# Patient Record
Sex: MALE | Race: WHITE | NOT HISPANIC OR LATINO | Employment: UNEMPLOYED | URBAN - METROPOLITAN AREA
[De-identification: names, ages, dates, MRNs, and addresses within clinical notes are randomized per-mention and may not be internally consistent; named-entity substitution may affect disease eponyms.]

---

## 2023-11-29 ENCOUNTER — HOSPITAL ENCOUNTER (EMERGENCY)
Facility: HOSPITAL | Age: 53
Discharge: HOME | End: 2023-11-29
Attending: EMERGENCY MEDICINE

## 2023-11-29 VITALS
SYSTOLIC BLOOD PRESSURE: 175 MMHG | HEART RATE: 83 BPM | RESPIRATION RATE: 18 BRPM | TEMPERATURE: 98 F | WEIGHT: 200 LBS | DIASTOLIC BLOOD PRESSURE: 85 MMHG | OXYGEN SATURATION: 100 %

## 2023-11-29 DIAGNOSIS — S91.009A: ICD-10-CM

## 2023-11-29 DIAGNOSIS — M65.30 TRIGGER FINGER OF RIGHT HAND, UNSPECIFIED FINGER: Primary | ICD-10-CM

## 2023-11-29 LAB
ALBUMIN SERPL-MCNC: 3.5 G/DL (ref 3.5–5)
ALBUMIN/GLOB SERPL: 0.9 RATIO (ref 1.1–2)
ALP SERPL-CCNC: 77 UNIT/L (ref 40–150)
ALT SERPL-CCNC: 10 UNIT/L (ref 0–55)
AST SERPL-CCNC: 16 UNIT/L (ref 5–34)
BASOPHILS # BLD AUTO: 0.02 X10(3)/MCL
BASOPHILS NFR BLD AUTO: 0.3 %
BILIRUB SERPL-MCNC: 0.2 MG/DL
BUN SERPL-MCNC: 11.4 MG/DL (ref 8.4–25.7)
CALCIUM SERPL-MCNC: 8.6 MG/DL (ref 8.4–10.2)
CHLORIDE SERPL-SCNC: 109 MMOL/L (ref 98–107)
CO2 SERPL-SCNC: 26 MMOL/L (ref 22–29)
CREAT SERPL-MCNC: 0.79 MG/DL (ref 0.73–1.18)
CRP SERPL-MCNC: 13 MG/L
EOSINOPHIL # BLD AUTO: 0.35 X10(3)/MCL (ref 0–0.9)
EOSINOPHIL NFR BLD AUTO: 6 %
ERYTHROCYTE [DISTWIDTH] IN BLOOD BY AUTOMATED COUNT: 12.9 % (ref 11.5–17)
ERYTHROCYTE [SEDIMENTATION RATE] IN BLOOD: 24 MM/HR (ref 0–15)
GFR SERPLBLD CREATININE-BSD FMLA CKD-EPI: >60 MLS/MIN/1.73/M2
GLOBULIN SER-MCNC: 3.9 GM/DL (ref 2.4–3.5)
GLUCOSE SERPL-MCNC: 102 MG/DL (ref 74–100)
HCT VFR BLD AUTO: 36.1 % (ref 42–52)
HGB BLD-MCNC: 11.9 G/DL (ref 14–18)
HOLD SPECIMEN: NORMAL
IMM GRANULOCYTES # BLD AUTO: 0.01 X10(3)/MCL (ref 0–0.04)
IMM GRANULOCYTES NFR BLD AUTO: 0.2 %
LYMPHOCYTES # BLD AUTO: 2.13 X10(3)/MCL (ref 0.6–4.6)
LYMPHOCYTES NFR BLD AUTO: 36.6 %
MCH RBC QN AUTO: 31.2 PG (ref 27–31)
MCHC RBC AUTO-ENTMCNC: 33 G/DL (ref 33–36)
MCV RBC AUTO: 94.5 FL (ref 80–94)
MONOCYTES # BLD AUTO: 0.36 X10(3)/MCL (ref 0.1–1.3)
MONOCYTES NFR BLD AUTO: 6.2 %
NEUTROPHILS # BLD AUTO: 2.95 X10(3)/MCL (ref 2.1–9.2)
NEUTROPHILS NFR BLD AUTO: 50.7 %
NRBC BLD AUTO-RTO: 0 %
PLATELET # BLD AUTO: 321 X10(3)/MCL (ref 130–400)
PMV BLD AUTO: 10.5 FL (ref 7.4–10.4)
POTASSIUM SERPL-SCNC: 3.3 MMOL/L (ref 3.5–5.1)
PROT SERPL-MCNC: 7.4 GM/DL (ref 6.4–8.3)
RBC # BLD AUTO: 3.82 X10(6)/MCL (ref 4.7–6.1)
SODIUM SERPL-SCNC: 144 MMOL/L (ref 136–145)
WBC # SPEC AUTO: 5.82 X10(3)/MCL (ref 4.5–11.5)

## 2023-11-29 PROCEDURE — 99284 EMERGENCY DEPT VISIT MOD MDM: CPT

## 2023-11-29 PROCEDURE — 86140 C-REACTIVE PROTEIN: CPT | Performed by: NURSE PRACTITIONER

## 2023-11-29 PROCEDURE — 80053 COMPREHEN METABOLIC PANEL: CPT | Performed by: NURSE PRACTITIONER

## 2023-11-29 PROCEDURE — 85025 COMPLETE CBC W/AUTO DIFF WBC: CPT | Performed by: NURSE PRACTITIONER

## 2023-11-29 PROCEDURE — 85652 RBC SED RATE AUTOMATED: CPT | Performed by: NURSE PRACTITIONER

## 2023-11-29 RX ORDER — BACLOFEN 10 MG/1
10 TABLET ORAL 3 TIMES DAILY PRN
Qty: 21 TABLET | Refills: 0 | Status: SHIPPED | OUTPATIENT
Start: 2023-11-29 | End: 2023-12-06

## 2023-11-29 RX ORDER — GENTAMICIN SULFATE 1 MG/G
OINTMENT TOPICAL DAILY
Qty: 15 G | Refills: 0 | Status: SHIPPED | OUTPATIENT
Start: 2023-11-29

## 2023-11-29 RX ORDER — DICLOFENAC SODIUM 75 MG/1
75 TABLET, DELAYED RELEASE ORAL 2 TIMES DAILY
Qty: 14 TABLET | Refills: 0 | Status: SHIPPED | OUTPATIENT
Start: 2023-11-29 | End: 2023-12-06

## 2023-11-29 RX ORDER — SULFAMETHOXAZOLE AND TRIMETHOPRIM 800; 160 MG/1; MG/1
1 TABLET ORAL 2 TIMES DAILY
Qty: 14 TABLET | Refills: 0 | Status: SHIPPED | OUTPATIENT
Start: 2023-11-29 | End: 2023-12-06

## 2023-11-29 NOTE — ED PROVIDER NOTES
"Encounter Date: 11/29/2023       History     Chief Complaint   Patient presents with    Chronic Pain     Pt reports chronic right ankle pain and chronic finger pain, DENIES ANY RECENT INJURY.      Pt is a 53 y.o. male who presents to the [facility] ED complaining of a worsening wound to his Lt ankle as well as "trigger finger" to his Rt middle finger. Pt reports wound has been slowly developing for approx 1 year. Hx of surgical repair to area and states the wound began from "the screws backing out." Denies loss of sensation to affected joint, fever, chest pain, SOB, weakness, or dizziness. Reports change to his finger have been present x 2 months. Denies injury to affected digit, redness to digit, or loss of sensation to area. Pt denies having a PCP.       Review of patient's allergies indicates:  No Known Allergies  No past medical history on file.  No past surgical history on file.  No family history on file.     Review of Systems   Constitutional:  Negative for chills, diaphoresis, fatigue and fever.   HENT:  Negative for facial swelling, postnasal drip, rhinorrhea, sinus pressure, sinus pain, sore throat and trouble swallowing.    Respiratory:  Negative for cough, chest tightness, shortness of breath and wheezing.    Cardiovascular:  Negative for chest pain, palpitations and leg swelling.   Gastrointestinal:  Negative for abdominal pain, diarrhea, nausea and vomiting.   Genitourinary:  Negative for dysuria, flank pain, hematuria and urgency.   Musculoskeletal:  Positive for arthralgias. Negative for back pain and myalgias.   Skin:  Positive for wound. Negative for color change and rash.   Neurological:  Negative for dizziness, syncope, weakness and headaches.   Hematological:  Does not bruise/bleed easily.   All other systems reviewed and are negative.      Physical Exam     Initial Vitals [11/29/23 0742]   BP Pulse Resp Temp SpO2   (!) 189/96 78 18 98.2 °F (36.8 °C) 100 %      MAP       --         Physical " Exam    Nursing note and vitals reviewed.  Constitutional: Vital signs are normal. He appears well-developed and well-nourished.   HENT:   Head: Normocephalic.   Nose: Nose normal.   Mouth/Throat: Oropharynx is clear and moist.   Eyes: Conjunctivae and EOM are normal. Pupils are equal, round, and reactive to light.   Neck: Neck supple.   Normal range of motion.  Cardiovascular:  Normal rate, regular rhythm, normal heart sounds and intact distal pulses.           Pulmonary/Chest: Effort normal and breath sounds normal. No respiratory distress. He has no wheezes. He has no rhonchi. He has no rales. He exhibits no tenderness.   Abdominal: Abdomen is soft and flat. Bowel sounds are normal. There is no abdominal tenderness. There is no rebound, no guarding, no tenderness at McBurney's point and negative Escamilla's sign.   Musculoskeletal:         General: Normal range of motion.      Right hand: Deformity (Middle finger contracted in a flexed position. No erythema noted. Tenderness to touch.) and tenderness present. Normal sensation. There is no disruption of two-point discrimination. Normal capillary refill. Normal pulse.      Cervical back: Normal range of motion and neck supple.      Left ankle: Tenderness present. Normal pulse.        Feet:      Neurological: He is alert and oriented to person, place, and time. He has normal strength.   Skin: Skin is warm and dry. Capillary refill takes less than 2 seconds.   Psychiatric: He has a normal mood and affect. His behavior is normal. Judgment and thought content normal.         ED Course   Procedures  Labs Reviewed   COMPREHENSIVE METABOLIC PANEL - Abnormal; Notable for the following components:       Result Value    Potassium Level 3.3 (*)     Chloride 109 (*)     Glucose Level 102 (*)     Globulin 3.9 (*)     Albumin/Globulin Ratio 0.9 (*)     All other components within normal limits   C-REACTIVE PROTEIN - Abnormal; Notable for the following components:    C-Reactive  Protein 13.00 (*)     All other components within normal limits   SEDIMENTATION RATE, AUTOMATED - Abnormal; Notable for the following components:    Sed Rate 24 (*)     All other components within normal limits   CBC WITH DIFFERENTIAL - Abnormal; Notable for the following components:    RBC 3.82 (*)     Hgb 11.9 (*)     Hct 36.1 (*)     MCV 94.5 (*)     MCH 31.2 (*)     MPV 10.5 (*)     All other components within normal limits   CBC W/ AUTO DIFFERENTIAL    Narrative:     The following orders were created for panel order CBC auto differential.  Procedure                               Abnormality         Status                     ---------                               -----------         ------                     CBC with Differential[[id number]]       Abnormal            Final result                 Please view results for these tests on the individual orders.   EXTRA TUBES    Narrative:     The following orders were created for panel order EXTRA TUBES.  Procedure                               Abnormality         Status                     ---------                               -----------         ------                     Light Blue Top Hold[[id number]]                             In process                 Red Top Hold[[id number]]                                    In process                 Gold Top Hold[[id number]]                                   In process                 Pink Top Hold[[id number]]                                   In process                   Please view results for these tests on the individual orders.   LIGHT BLUE TOP HOLD   RED TOP HOLD   GOLD TOP HOLD   PINK TOP HOLD          Imaging Results              X-Ray Hand 3 View Right (Final result)  Result time 11/29/23 09:05:52      Final result by Rick Raymundo MD (11/29/23 09:05:52)                   Narrative:    EXAMINATION  XR HAND COMPLETE 3 VIEW RIGHT    CLINICAL HISTORY  trigger finger;    TECHNIQUE  A total of 4 images  submitted of the right hand.    COMPARISON  None available at the time of initial interpretation.    FINDINGS  There is persistent flexion of the 3rd digit PIP joint.  No evidence of acute cortical displacement or dislocation is identified.  There is no periosteal reaction or destructive skeletal lesion.  There is chronic cortical irregularity at the base of the 1st digit proximal phalanx, may be related to remote trauma but otherwise indeterminate etiology.    The included soft tissues are without acute abnormality.    IMPRESSION  1. Persistent flexion of the 3rd PIP joint.  2. No convincing acute osseous abnormality.  3. Chronic appearing changes of the 1st digit proximal phalanx base.      Electronically signed by: Rick Raymundo  Date:    11/29/2023  Time:    09:05                                     X-Ray Ankle Complete Left (Final result)  Result time 11/29/23 09:08:22      Final result by Rick Raymundo MD (11/29/23 09:08:22)                   Narrative:    EXAMINATION  XR ANKLE COMPLETE 3 VIEW LEFT    CLINICAL HISTORY  Unspecified open wound, unspecified ankle, initial encounter    TECHNIQUE  A total of 3 images submitted of the left ankle.    COMPARISON  1 October 2015    FINDINGS  Internal fixation hardware of the medial malleolus and distal fibula is similar in the interval, with no evidence of osseous loosening or gross component irregularity.  Chronic posttraumatic changes of the hindfoot and degenerative alterations of the midfoot are appreciated.  There is no convincing acute osseous displacement or joint incongruity.  No periosteal reaction or destructive skeletal lesion.    Diffuse soft tissue swelling is present.  There is skin contour irregularity at the medial ankle consistent with open wound.  No tracking subcutaneous gas or radiopaque foreign body is visualized.    IMPRESSION  1. Medial ankle soft tissue wound with regional swelling.  2. Chronic osseous changes with no evidence of acute bony  abnormality.      Electronically signed by: Rickpk Raymundo  Date:    11/29/2023  Time:    09:08                      Wet Read by Jasvir Garcia DO (11/29/23 08:41:40, [facility], Emergency Medicine)    Medial soft tissue defect. Hardware intact. No acute displaced fracture.                                     Medications - No data to display  Medical Decision Making  Differential:  Trigger finger  Open wound of ankle  Osteomyelitis  Cellulitis      Amount and/or Complexity of Data Reviewed  Labs: ordered.  Radiology: ordered and independent interpretation performed.               ED Course as of 11/29/23 0940   Wed Nov 29, 2023   0901 WBC: 5.82 [IB]   0902 Hemoglobin(!): 11.9 [IB]   0902 Platelet Count: 321 [IB]   0902 Sed Rate(!): 24 [IB]   0908 CRP(!): 13.00 [IB]   0908 Creatinine: 0.79 [IB]   0933 Discussed pt status with Dr. Garcia, ED physician. Physician denies need for pt admission or IV antibiotics. Fiagnostic labs show mild inflammatory response and imaging shows not hardware failure. I have discussed with pt my plan to place him on oral antibiotics as well as to refer him to both IM Clinic for a PCP and the Wound Care Clinic for follow up. Pt will clean wound once daily and apply topical ointment to area. Stressed to pt his need to return to the [facility] ED for worsening pain, drainage, or redness to area. Understanding voiced per pt. [JA]      ED Course User Index  [IB] Jasvir Garcia DO  [JA] Wilson Thompson Jr., FNP                             Clinical Impression:  Final diagnoses:  [S91.009A] Wound of ankle, initial encounter  [M65.30] Trigger finger of right hand, unspecified finger (Primary)          ED Disposition Condition    Discharge Stable          ED Prescriptions       Medication Sig Dispense Start Date End Date Auth. Provider    diclofenac (VOLTAREN) 75 MG EC tablet Take 1 tablet (75 mg total) by mouth 2 (two) times daily. for 7 days 14 tablet 11/29/2023 12/6/2023  Wilson Thompson Jr., RAFAEL    baclofen (LIORESAL) 10 MG tablet Take 1 tablet (10 mg total) by mouth 3 (three) times daily as needed (muscle spasms). 21 tablet 11/29/2023 12/6/2023 Wilson Thompson Jr., RAFAEL    sulfamethoxazole-trimethoprim 800-160mg (BACTRIM DS) 800-160 mg Tab Take 1 tablet by mouth 2 (two) times daily. for 7 days 14 tablet 11/29/2023 12/6/2023 Wilson Thompson Jr., FNP    gentamicin (GARAMYCIN) 0.1 % ointment Apply topically once daily. After cleaning and drying wound, apply thin layer. 15 g 11/29/2023 -- Wilson Thompson Jr., FNP          Follow-up Information       Follow up With Specialties Details Why Contact Info    [facility] Emergency Medicine In 3 days As needed, If symptoms worsen [address] [place] [id number] [phone]    [facility]  Schedule an appointment as soon as possible for a visit in 1 week Follow up with [facility] to obtain a PCP [address] [place] [phone]    [facility]  Schedule an appointment as soon as possible for a visit in 5 days For follow up with Wound Care Clinic in next 5-7 days for follow up for issue [address] [place] [phone]             Wilson Thompson Jr., FNP  11/29/23 6407

## 2023-11-29 NOTE — Clinical Note
"Tawanda Jeffriesne" Elizabeth was seen and treated in our emergency department on 11/29/2023.  He may return to work on 12/01/2023.       If you have any questions or concerns, please don't hesitate to call.      Wilson Thompson Jr., FNP"

## 2023-11-29 NOTE — DISCHARGE INSTRUCTIONS
Clean wound once daily, pat dry, and apply gentamicin ointment.   Return to the [facility] ED immediately for worsening redness, purulent drainage from wound, or fever.   Follow up with IM Clinic as discussed to obtain a PCP.  Take pain medication as prescribed for no more than 7 days.  Take medication as prescribed.  Follow up with [facility] Wound Care [facility] as discussed.

## 2023-12-05 ENCOUNTER — HOSPITAL ENCOUNTER (OUTPATIENT)
Dept: WOUND CARE | Facility: HOSPITAL | Age: 53
Discharge: HOME | End: 2023-12-05
Attending: NURSE PRACTITIONER

## 2023-12-05 ENCOUNTER — OFFICE VISIT (OUTPATIENT)
Dept: VASCULAR SURGERY | Facility: CLINIC | Age: 53
End: 2023-12-05
Attending: NURSE PRACTITIONER

## 2023-12-05 VITALS
HEART RATE: 73 BPM | DIASTOLIC BLOOD PRESSURE: 82 MMHG | TEMPERATURE: 98 F | SYSTOLIC BLOOD PRESSURE: 145 MMHG | OXYGEN SATURATION: 98 %

## 2023-12-05 VITALS
HEART RATE: 63 BPM | SYSTOLIC BLOOD PRESSURE: 131 MMHG | RESPIRATION RATE: 18 BRPM | DIASTOLIC BLOOD PRESSURE: 84 MMHG | WEIGHT: 223 LBS | BODY MASS INDEX: 29.55 KG/M2 | OXYGEN SATURATION: 97 % | TEMPERATURE: 98 F | HEIGHT: 73 IN

## 2023-12-05 DIAGNOSIS — I73.9 PERIPHERAL VASCULAR DISEASE: ICD-10-CM

## 2023-12-05 DIAGNOSIS — Z96.9 RETAINED ORTHOPEDIC HARDWARE: ICD-10-CM

## 2023-12-05 DIAGNOSIS — L97.825 NON-PRESSURE CHRONIC ULCER OF OTHER PART OF LEFT LOWER LEG WITH MUSCLE INVOLVEMENT WITHOUT EVIDENCE OF NECROSIS: Primary | ICD-10-CM

## 2023-12-05 DIAGNOSIS — L97.909 VENOUS STASIS ULCER, UNSPECIFIED SITE, UNSPECIFIED ULCER STAGE, UNSPECIFIED WHETHER VARICOSE VEINS PRESENT: Primary | ICD-10-CM

## 2023-12-05 DIAGNOSIS — Z72.0 TOBACCO ABUSE: ICD-10-CM

## 2023-12-05 DIAGNOSIS — M65.331 TRIGGER MIDDLE FINGER OF RIGHT HAND: ICD-10-CM

## 2023-12-05 DIAGNOSIS — S91.009A: ICD-10-CM

## 2023-12-05 DIAGNOSIS — I83.009 VENOUS STASIS ULCER, UNSPECIFIED SITE, UNSPECIFIED ULCER STAGE, UNSPECIFIED WHETHER VARICOSE VEINS PRESENT: Primary | ICD-10-CM

## 2023-12-05 PROCEDURE — 99215 OFFICE O/P EST HI 40 MIN: CPT | Mod: PBBFAC,25,27

## 2023-12-05 PROCEDURE — 99203 OFFICE O/P NEW LOW 30 MIN: CPT | Mod: ,,, | Performed by: NURSE PRACTITIONER

## 2023-12-05 PROCEDURE — 99211 OFF/OP EST MAY X REQ PHY/QHP: CPT | Mod: 25

## 2023-12-05 PROCEDURE — 29580 STRAPPING UNNA BOOT: CPT

## 2023-12-05 PROCEDURE — 99203 PR OFFICE/OUTPT VISIT, NEW, LEVL III, 30-44 MIN: ICD-10-PCS | Mod: ,,, | Performed by: NURSE PRACTITIONER

## 2023-12-05 PROCEDURE — 27000999 HC MEDICAL RECORD PHOTO DOCUMENTATION

## 2023-12-05 NOTE — PROGRESS NOTES
[facility] General Surgery Clinic Note    HPI: Tawanda Johnson is a 52yo M with a PMHx of abdominal hernia repair, hand trauma resulting in multiple hand surgeries, and left ankle surgery with hardware placement approximately 20 years ago, presenting to clinic today on referral from wound care for concern of venous stasis ulcer of L medial ankle. The patient states that approximately one year ago, he first noticed a scab over his left medial ankle which gradually became larger, until the came became severe and prompted him to seek treatment. The patient works as a  and states that his ankle pain and swelling worsens the longer he stands in the day, but is relieved with leg elevation. He denies any functional limitation in walking distance, increased pain with exertion in legs suspicious for claudication, or prior history of deep venous thrombosis. He has never used compression stockings before. He smoked one pack a week from age [age].     PMH: No past medical history on file.   Meds:   Current Outpatient Medications:     baclofen (LIORESAL) 10 MG tablet, Take 1 tablet (10 mg total) by mouth 3 (three) times daily as needed (muscle spasms)., Disp: 21 tablet, Rfl: 0    diclofenac (VOLTAREN) 75 MG EC tablet, Take 1 tablet (75 mg total) by mouth 2 (two) times daily. for 7 days, Disp: 14 tablet, Rfl: 0    sulfamethoxazole-trimethoprim 800-160mg (BACTRIM DS) 800-160 mg Tab, Take 1 tablet by mouth 2 (two) times daily. for 7 days, Disp: 14 tablet, Rfl: 0    gentamicin (GARAMYCIN) 0.1 % ointment, Apply topically once daily. After cleaning and drying wound, apply thin layer. (Patient not taking: Reported on 12/5/2023), Disp: 15 g, Rfl: 0  Allergies: Review of patient's allergies indicates:  No Known Allergies  Social History:   Social History     Tobacco Use    Smoking status: Former     Types: Cigarettes    Smokeless tobacco: Never   Substance Use Topics    Alcohol use: Yes     Alcohol/week: 6.0 standard drinks of alcohol      Types: 6 Cans of beer per week     Comment: 6 pk per week    Drug use: Yes     Frequency: 7.0 times per week     Types: Marijuana     Comment: gummies daily     Family History: No family history on file.  Surgical History:   Past Surgical History:   Procedure Laterality Date    ABDOMINAL HERNIA REPAIR Left     x2    ANKLE SURGERY Left     FINGER SURGERY Left     middle    FINGER SURGERY Right     thumb    WRIST SURGERY Left      Review of Systems:  ROS negative except as per HPI    Objective:    Vitals:  Vitals:    12/05/23 1246   BP: 131/84   Pulse: 63   Resp: 18   Temp: 97.7 °F (36.5 °C)        Physical Exam:  Gen: NAD  Neuro: awake, alert, answering questions appropriately  CV: RR, +2 radial pulses  Resp: non-labored breathing  Vascular: Palpable femoral and popliteal pulses bilaterally. Triphasic DP and peroneal pulses on doppler of left lower extremity. Triphasic DP and PT pulses on doppler of right lower extremity.    Imaging:  XR ANKLE COMPLETE 3 VIEW LEFT 11/29/23  FINDINGS  Internal fixation hardware of the medial malleolus and distal fibula is similar in the interval, with no evidence of osseous loosening or gross component irregularity.  Chronic posttraumatic changes of the hindfoot and degenerative alterations of the midfoot are appreciated.  There is no convincing acute osseous displacement or joint incongruity.  No periosteal reaction or destructive skeletal lesion.  Diffuse soft tissue swelling is present.  There is skin contour irregularity at the medial ankle consistent with open wound.  No tracking subcutaneous gas or radiopaque foreign body is visualized.     IMPRESSION  1. Medial ankle soft tissue wound with regional swelling.  2. Chronic osseous changes with no evidence of acute bony abnormality.    Assessment/Plan:  Tawanda Johnson is a 54yo M with a PMHx of left ankle surgery with hardware placement approximately 20 years ago presenting to clinic today on referral from wound care for  concern of venous stasis ulcer of L medial ankle.     -encourage use of compression stockings and biweekly Unna boot dressing changes with wound care  -RTC 3 weeks with left lower extremity venous ultrasound to evaluate for potential future venous intervention    Kan Naranjo, MS3  12/05/2023 1:35 PM      PGY3 Addendum:   I have read and agree with the note/documentation as above. I have made edits and addendums as necessary.     Venus Galeana  [employer] General Surgery, PGY3

## 2023-12-05 NOTE — PROGRESS NOTES
[facility]   Outpatient Wound Care     Subjective:   Patient ID: Tawanda Johnson is a 53 y.o. male.    Chief Complaint: Wound Care (Left ankle wound)      History of Present Illness:   53 y.o. White male being seen today for left lower leg ulcer, right hand middle trigger finger, referral from the ED.  Wound has been present for approximately 1 year.   Current wound care:  Gauze with compression stocking. Wound care being done today at bedside cleansed with Vashe, silver polymem, gauze, kerlix, and secure with Tubigrip size F.  Followed by No, Primary Doctor for PCP referral placed for PCP.  Works as a .      Today's visit 12/05/2023:  Presents to clinic alone. Left lower extremity medial surface dressing removed per nursing staff at bedside.  Patient has a large chronic ulcer to left lower medial extremity that has been there proximally 1 year.  Denies any vascular or arterial insufficiency informed the patient due to hemosiderin staining, and wound appearance has some sort of insufficiency to area.  Patient voiced has had a history of fracture in left lower extremity hardware place of the left lower extremity.  Left lower extremity chronic ulcer red granulating with yellow slough to wound bed.  Unable to debride at bedside today due to increase in pain.  Elizabeth wound surrounded by redness and hemosiderin staining.  Patient is currently taking Bactrim by mouth.  Scheduled with the patient to see vascular today.  Instructed the patient would like to place an Unna boot to left lower extremity will have him see vascular 1st and then have him follow up with us after.  Patient currently has apply for medicated was denied.  He is self pay.  Patient will visit [place] [phone] for assistance with insurance.  Place referral for Orthopedics for right hand middle  trigger finger.  Wound care performed today at bedside left lower extremity ulcer cleansed with Vashe, silver polymem to wound bed, gauze, Kerlix, and secure with Tubigrip size F.  Instructed wound care to perform every-other-day.  Instructed to elevate lower extremity while out of bed for long periods of time.  Place lidocaine 5% topical gel to area today due to pain.  Instructed the patient will have to take over-the-counter Tylenol or ibuprofen alternating as to pain.  Verbal as understanding of all instructions.    History includes:      History reviewed. No pertinent past medical history. History reviewed. No pertinent surgical history.   Social History     Socioeconomic History    Marital status:    Tobacco Use    Smoking status: Former     Types: Cigarettes    Smokeless tobacco: Never   .      Current Outpatient Medications   Medication Sig Dispense Refill    baclofen (LIORESAL) 10 MG tablet Take 1 tablet (10 mg total) by mouth 3 (three) times daily as needed (muscle spasms). 21 tablet 0    diclofenac (VOLTAREN) 75 MG EC tablet Take 1 tablet (75 mg total) by mouth 2 (two) times daily. for 7 days 14 tablet 0    sulfamethoxazole-trimethoprim 800-160mg (BACTRIM DS) 800-160 mg Tab Take 1 tablet by mouth 2 (two) times daily. for 7 days 14 tablet 0    gentamicin (GARAMYCIN) 0.1 % ointment Apply topically once daily. After cleaning and drying wound, apply thin layer. (Patient not taking: Reported on 12/5/2023) 15 g 0     No current facility-administered medications for this encounter.       Review of Systems   Skin:  Positive for wound.   All other systems reviewed and are negative.         Labs Reviewed:   Chemistry:  Lab Results   Component Value Date    BUN 11.4 11/29/2023    CREATININE 0.79 11/29/2023    EGFRNORACEVR >60 11/29/2023    GLUCOSE 102 (H) 11/29/2023    AST 16 11/29/2023    ALT 10 11/29/2023        Hematology:  Lab Results   Component Value Date    WBC 5.82 11/29/2023    HGB 11.9 (L)  11/29/2023    HCT 36.1 (L) 11/29/2023     11/29/2023       Inflammatory Markers:  Lab Results   Component Value Date    SEDRATE 24 (H) 11/29/2023        Objective:        Physical Exam  Vitals reviewed.   Cardiovascular:      Pulses:           Dorsalis pedis pulses are 2+ on the right side and 2+ on the left side.        Posterior tibial pulses are 2+ on the right side and detected w/ Doppler on the left side.   Feet:      Right foot:      Toenail Condition: Right toenails are normal.      Left foot:      Toenail Condition: Left toenails are normal.   Skin:     General: Skin is warm and dry.      Capillary Refill: Capillary refill takes less than 2 seconds.      Findings: Wound present.             Comments: Left lower extremity medial size ulcer red granulating wound bed with slough noted moderate serosanguineous drainage.   Neurological:      Mental Status: He is alert.            Incision/Site 12/05/23 0837 Left Leg lower;medial (Active)   12/05/23 0837   Present Prior to Hospital Arrival?: Yes   Side: Left   Location: Leg   Orientation: lower;medial   Incision Type:    Closure Method:    Additional Comments:    Removal Indication and Assessment:    Wound Outcome:    Removal Indications:    Wound Image   12/05/23 0836   Dressing Appearance Moist drainage 12/05/23 0836   Drainage Amount Moderate 12/05/23 0836   Drainage Characteristics/Odor Serosanguineous 12/05/23 0836   Appearance Pink;Slough 12/05/23 0836   Wound Length (cm) 6.2 cm 12/05/23 0836   Wound Width (cm) 4 cm 12/05/23 0836   Wound Depth (cm) 0.5 cm 12/05/23 0836   Wound Volume (cm^3) 12.4 cm^3 12/05/23 0836   Wound Surface Area (cm^2) 24.8 cm^2 12/05/23 0836         Assessment:         ICD-10-CM ICD-9-CM   1. 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 Wound of ankle, initial encounter  S91.009A 891.0   3. Peripheral vascular disease  I73.9 443.9   4.  Retained orthopedic hardware  Z96.9 V45.89   5. Tobacco abuse  Z72.0 305.1   6. Trigger middle finger of right hand  M65.331 727.03         Plan:   Tissue pathology and/or culture taken:  [] Yes [x] No   Sharp debridement performed:   [] Yes [x] No   Labs ordered this visit:   [] Yes [x] No   Imaging ordered this visit:   [] Yes [x] No         1. 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 Will place unna boot after Vascular appt.     Monitor for any signs of infection: Watch for increased drainage or pain, fevers, chills, swelling and report this to clinic.   2. Wound of ankle, initial encounter     Referral from ED.   3. Peripheral vascular disease     Referral to Vascular will see today at 1pm   4. Retained orthopedic hardware     Co-factor in delayed wound healing.    5. Tobacco abuse     Must stop smoking. Explained the negative impact this has on not only the wounds (delayed healing), but overall health as well.   6. Trigger middle finger of right hand     Referral placed for orthopedics.        Orders Placed This Encounter   Procedures    Ambulatory referral/consult to Wound Clinic     Standing Status:   Standing     Number of Occurrences:   1     Referral Priority:   Urgent     Referral Type:   Consultation     Referral Reason:   Specialty Services Required     Requested Specialty:   Wound Care     Number of Visits Requested:   1    Ambulatory referral/consult to Vascular Surgery     Standing Status:   Future     Standing Expiration Date:   1/5/2025     Referral Priority:   Routine     Referral Type:   Consultation     Referral Reason:   Specialty Services Required     Requested Specialty:   Vascular Surgery     Number of Visits Requested:   1    Ambulatory referral/consult to Orthopedics     Standing Status:    Future     Standing Expiration Date:   1/5/2025     Referral Priority:   Routine     Referral Type:   Consultation     Requested Specialty:   Orthopedic Surgery     Number of Visits Requested:   1        The time spent including preparing to see the patient, obtaining patient history and assessment, evaluation of the plan of care, patient/caregiver counseling and education, orders, documentation, coordination of care, and other professional medical management activities for today's encounter was 20 minute.    Time spent performing procedures during today's encounter was 20 minute.    Follow up in about 3 days (around 12/8/2023). Teaching provided on s/s to call wound clinic for promptly.  ER precautions taught for after hours and weekends.         RAFAEL Bailey

## 2023-12-08 ENCOUNTER — HOSPITAL ENCOUNTER (OUTPATIENT)
Dept: WOUND CARE | Facility: HOSPITAL | Age: 53
Discharge: HOME | End: 2023-12-08
Attending: NURSE PRACTITIONER

## 2023-12-08 VITALS
TEMPERATURE: 98 F | RESPIRATION RATE: 18 BRPM | HEART RATE: 80 BPM | OXYGEN SATURATION: 98 % | SYSTOLIC BLOOD PRESSURE: 143 MMHG | DIASTOLIC BLOOD PRESSURE: 87 MMHG

## 2023-12-08 DIAGNOSIS — L97.825 NON-PRESSURE CHRONIC ULCER OF OTHER PART OF LEFT LOWER LEG WITH MUSCLE INVOLVEMENT WITHOUT EVIDENCE OF NECROSIS: Primary | ICD-10-CM

## 2023-12-08 DIAGNOSIS — S91.009D: ICD-10-CM

## 2023-12-08 DIAGNOSIS — Z72.0 TOBACCO ABUSE: ICD-10-CM

## 2023-12-08 DIAGNOSIS — Z96.9 RETAINED ORTHOPEDIC HARDWARE: ICD-10-CM

## 2023-12-08 DIAGNOSIS — I73.9 PERIPHERAL VASCULAR DISEASE: ICD-10-CM

## 2023-12-08 PROCEDURE — 99211 OFF/OP EST MAY X REQ PHY/QHP: CPT

## 2023-12-08 PROCEDURE — 27000999 HC MEDICAL RECORD PHOTO DOCUMENTATION

## 2023-12-08 PROCEDURE — 99213 OFFICE O/P EST LOW 20 MIN: CPT | Mod: ,,, | Performed by: NURSE PRACTITIONER

## 2023-12-08 PROCEDURE — 99213 PR OFFICE/OUTPT VISIT, EST, LEVL III, 20-29 MIN: ICD-10-PCS | Mod: ,,, | Performed by: NURSE PRACTITIONER

## 2023-12-08 PROCEDURE — 29580 STRAPPING UNNA BOOT: CPT

## 2023-12-08 NOTE — PROGRESS NOTES
[facility]   Outpatient Wound Care     Subjective:   Patient ID: Tawanda Johnson is a 53 y.o. male.    Chief Complaint: Wound Consult      History of Present Illness:   53 y.o. White male being seen today for follow up after Unna boot placement to left lower leg ulcer.  Wound has been present for approximately 1 year.   Current wound care: Unna boot placement. Followed by No, Primary Doctor for PCP referral placed for PCP.  Works as a .      Today's visit 12/8/23:  Reviewed previous progress notes.  Unna boot removed per nursing staff at bedside.  Large amount of moderate serosanguineous drainage with dressing removal.  Left lower extremity ulcer red with slough noted to wound bed significant decrease in size since Unna boot placement.  Periwound maceration to edges.  Discussion today will apply Unna boot.  Wound care to lower extremity wound bed cleansed with half strength Dakin's, applied silver polymem to wound bed, Triad to wound edges,Drawtex, Unna boot, and Coban.  Tolerated well.  Wound care performed per nursing staff at bedside.  Unna boot precautions given.  Will have him return to the clinic on Tuesday of next week.  Instructed to elevate lower extremity while out of bed for long periods of time.  Verbalized understanding of all instructions.        12/05/2023:  Presents to clinic alone. Left lower extremity medial surface dressing removed per nursing staff at bedside.  Patient has a large chronic ulcer to left lower medial extremity that has been there proximally 1 year.  Denies any vascular or arterial insufficiency informed the patient due to hemosiderin staining, and wound appearance has some sort of insufficiency to area.  Patient voiced has had a history of fracture in left lower extremity hardware place of the left lower extremity.   Left lower extremity chronic ulcer red granulating with yellow slough to wound bed.  Unable to debride at bedside today due to increase in pain.  Elizabeth wound surrounded by redness and hemosiderin staining.  Patient is currently taking Bactrim by mouth.  Scheduled with the patient to see vascular today.  Instructed the patient would like to place an Unna boot to left lower extremity will have him see vascular 1st and then have him follow up with us after.  Patient currently has apply for medicated was denied.  He is self pay.  Patient will visit [place] [phone] for assistance with insurance.  Place referral for Orthopedics for right hand middle trigger finger.  Wound care performed today at bedside left lower extremity ulcer cleansed with Vashe, silver polymem to wound bed, gauze, Kerlix, and secure with Tubigrip size F.  Instructed wound care to perform every-other-day.  Instructed to elevate lower extremity while out of bed for long periods of time.  Place lidocaine 5% topical gel to area today due to pain.  Instructed the patient will have to take over-the-counter Tylenol or ibuprofen alternating as to pain.  Verbalized understanding of all instructions.    History includes:      History reviewed. No pertinent past medical history.   Past Surgical History:   Procedure Laterality Date    ABDOMINAL HERNIA REPAIR Left     x2    ANKLE SURGERY Left     FINGER SURGERY Left     middle    FINGER SURGERY Right     thumb    WRIST SURGERY Left       Social History     Socioeconomic History    Marital status:    Tobacco Use    Smoking status: Former     Types: Cigarettes    Smokeless tobacco: Never   Substance and Sexual Activity    Alcohol use: Yes     Alcohol/week: 6.0 standard drinks of alcohol     Types: 6 Cans of beer per week     Comment: 6 pk per week    Drug use: Yes     Frequency: 7.0 times per week     Types: Marijuana     Comment: gummies daily   .      Current Outpatient Medications   Medication Sig Dispense  Refill    baclofen (LIORESAL) 10 MG tablet Take 1 tablet (10 mg total) by mouth 3 (three) times daily as needed (muscle spasms). 21 tablet 0    gentamicin (GARAMYCIN) 0.1 % ointment Apply topically once daily. After cleaning and drying wound, apply thin layer. (Patient not taking: Reported on 12/5/2023) 15 g 0     No current facility-administered medications for this encounter.       Review of Systems   Skin:  Positive for wound.   All other systems reviewed and are negative.         Labs Reviewed:   Chemistry:  Lab Results   Component Value Date    BUN 11.4 11/29/2023    CREATININE 0.79 11/29/2023    EGFRNORACEVR >60 11/29/2023    GLUCOSE 102 (H) 11/29/2023    AST 16 11/29/2023    ALT 10 11/29/2023        Hematology:  Lab Results   Component Value Date    WBC 5.82 11/29/2023    HGB 11.9 (L) 11/29/2023    HCT 36.1 (L) 11/29/2023     11/29/2023       Inflammatory Markers:  Lab Results   Component Value Date    SEDRATE 24 (H) 11/29/2023        Objective:        Physical Exam  Vitals reviewed.   Cardiovascular:      Pulses:           Dorsalis pedis pulses are 2+ on the right side and 2+ on the left side.        Posterior tibial pulses are 2+ on the right side and detected w/ Doppler on the left side.   Feet:      Right foot:      Toenail Condition: Right toenails are normal.      Left foot:      Toenail Condition: Left toenails are normal.   Skin:     General: Skin is warm and dry.      Capillary Refill: Capillary refill takes less than 2 seconds.      Findings: Wound present.             Comments: Left lower extremity medial size ulcer red granulating wound bed with slough macerated skin edges  moderate serosanguineous drainage.   Neurological:      Mental Status: He is alert.            Incision/Site 12/05/23 0837 Left Leg lower;medial (Active)   12/05/23 0837   Present Prior to Hospital Arrival?: Yes   Side: Left   Location: Leg   Orientation: lower;medial   Incision Type:    Closure Method:    Additional  Comments:    Removal Indication and Assessment:    Wound Outcome:    Removal Indications:    Wound Image   12/08/23 0855   Dressing Appearance Moist drainage 12/08/23 0855   Drainage Amount Moderate 12/08/23 0855   Drainage Characteristics/Odor Serosanguineous;Malodorous 12/08/23 0855   Appearance Pink;Red;Yellow;Slough 12/08/23 0855   Wound Length (cm) 6.2 cm 12/08/23 0855   Wound Width (cm) 3.5 cm 12/08/23 0855   Wound Depth (cm) 0.5 cm 12/08/23 0855   Wound Volume (cm^3) 10.85 cm^3 12/08/23 0855   Wound Surface Area (cm^2) 21.7 cm^2 12/08/23 0855         Assessment:         ICD-10-CM ICD-9-CM   1. 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 Open wound of ankle with complication, unspecified laterality, subsequent encounter  S91.009D V58.89     891.1   3. Peripheral vascular disease  I73.9 443.9   4. Retained orthopedic hardware  Z96.9 V45.89   5. Tobacco abuse  Z72.0 305.1         Plan:   Tissue pathology and/or culture taken:  [] Yes [x] No   Sharp debridement performed:   [] Yes [x] No   Labs ordered this visit:   [] Yes [x] No   Imaging ordered this visit:   [] Yes [x] No         1. 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    2. Open wound of ankle with complication subsequent    Requiring Unna boot.    3. Peripheral vascular disease     Saw Vascular on 12/6/23 will continue unna boot until vascular studies done.    4. Retained orthopedic hardware     Co-factor in delayed wound healing.    5. Tobacco abuse     Must stop smoking. Explained the negative impact this has on not only the wounds (delayed healing), but overall health as well.       No orders of the defined types were placed in this encounter.       The time spent including preparing to see the patient, obtaining patient history and assessment, evaluation of the plan of  care, patient/caregiver counseling and education, orders, documentation, coordination of care, and other professional medical management activities for today's encounter was 15 minute.    Time spent performing procedures during today's encounter was 15 minute.    Follow up in about 4 days (around 12/12/2023) for [place] on Tuesday and nurse visit on Friday . Teaching provided on s/s to call wound clinic for promptly.  ER precautions taught for after hours and weekends.         RAFAEL Bailey

## 2023-12-11 NOTE — PROGRESS NOTES
The patient's arterial circulation is fine.  I think this is all venous.  I discussed the case with the wound care team.  We will continue with Unna boot therapy for now.  We will have the patient come back in 3 weeks to re-evaluate.  The patient needs a venous ultrasound to make sure there was no evidence of superficial or deep venous reflux.  The patient will contact the office with a change in his condition.

## 2023-12-12 ENCOUNTER — HOSPITAL ENCOUNTER (OUTPATIENT)
Dept: WOUND CARE | Facility: HOSPITAL | Age: 53
Discharge: HOME | End: 2023-12-12
Attending: NURSE PRACTITIONER

## 2023-12-12 VITALS
HEART RATE: 83 BPM | DIASTOLIC BLOOD PRESSURE: 69 MMHG | TEMPERATURE: 98 F | OXYGEN SATURATION: 97 % | SYSTOLIC BLOOD PRESSURE: 131 MMHG

## 2023-12-12 DIAGNOSIS — S91.009D: ICD-10-CM

## 2023-12-12 DIAGNOSIS — I73.9 PERIPHERAL VASCULAR DISEASE: ICD-10-CM

## 2023-12-12 DIAGNOSIS — Z72.0 TOBACCO ABUSE: ICD-10-CM

## 2023-12-12 DIAGNOSIS — L97.825 NON-PRESSURE CHRONIC ULCER OF OTHER PART OF LEFT LOWER LEG WITH MUSCLE INVOLVEMENT WITHOUT EVIDENCE OF NECROSIS: Primary | ICD-10-CM

## 2023-12-12 DIAGNOSIS — Z96.9 RETAINED ORTHOPEDIC HARDWARE: ICD-10-CM

## 2023-12-12 PROCEDURE — 99211 OFF/OP EST MAY X REQ PHY/QHP: CPT | Mod: 25

## 2023-12-12 PROCEDURE — 27000999 HC MEDICAL RECORD PHOTO DOCUMENTATION

## 2023-12-12 PROCEDURE — 29580 STRAPPING UNNA BOOT: CPT

## 2023-12-12 PROCEDURE — 87077 CULTURE AEROBIC IDENTIFY: CPT | Mod: 91 | Performed by: NURSE PRACTITIONER

## 2023-12-12 PROCEDURE — 99213 OFFICE O/P EST LOW 20 MIN: CPT | Mod: ,,, | Performed by: NURSE PRACTITIONER

## 2023-12-12 PROCEDURE — 99213 PR OFFICE/OUTPT VISIT, EST, LEVL III, 20-29 MIN: ICD-10-PCS | Mod: ,,, | Performed by: NURSE PRACTITIONER

## 2023-12-13 NOTE — PROGRESS NOTES
[facility]   Outpatient Wound Care     Subjective:   Patient ID: Tawanda Johnson is a 53 y.o. male.    Chief Complaint: Wound Care (Left lower leg wound/unna)      History of Present Illness:   53 y.o. White male being seen today for follow up after Unna boot placement to left lower leg ulcer.  Wound has been present for approximately 1 year.   Current wound care: Unna boot placement. Followed by No, Primary Doctor for PCP referral placed for PCP.  Works as a .      Today's visit 12/12/23:  Reviewed previous progress notes. Unna boot removed per nursing staff at bedside. Left lower leg medial wound with slough and red granulating tissue with moderate serosanguinous drainage. Would culture obtained. Discussion today regarding changing wound care to ulcer, and will continue unna boot. Wound care performed per nursing staff at bedside. Cleanse wound with Vashe, apply Medihoney foam to wound bed, cover with Drawtex, unna boot, Kerlix, and Coban. Pt complaining of itching at times. Instructed may take Benadryl for itching. Will have him return on Friday for nurse visit for unna boot change. Instructed to call office with any questions, concerns, or any reason he has to take off unna boot. Instructed to elevate lower extremity while out of bed for long periods of time.  Verbalized understanding of all instructions.      12/8/23:  Reviewed previous progress notes.  Unna boot removed per nursing staff at bedside.  Large amount of moderate serosanguineous drainage with dressing removal.  Left lower extremity ulcer red with slough noted to wound bed significant decrease in size since Unna boot placement.  Periwound maceration to edges.  Discussion today will apply Unna boot.  Wound care to lower extremity wound bed cleansed with half strength Dakin's, applied  silver polymem to wound bed, Triad to wound edges,Drawtex, Unna boot, and Coban.  Tolerated well.  Wound care performed per nursing staff at bedside.  Unna boot precautions given.  Will have him return to the clinic on Tuesday of next week.  Instructed to elevate lower extremity while out of bed for long periods of time.  Verbalized understanding of all instructions.    12/05/2023:  Presents to clinic alone. Left lower extremity medial surface dressing removed per nursing staff at bedside.  Patient has a large chronic ulcer to left lower medial extremity that has been there proximally 1 year.  Denies any vascular or arterial insufficiency informed the patient due to hemosiderin staining, and wound appearance has some sort of insufficiency to area.  Patient voiced has had a history of fracture in left lower extremity hardware place of the left lower extremity.  Left lower extremity chronic ulcer red granulating with yellow slough to wound bed.  Unable to debride at bedside today due to increase in pain.  Elizabeth wound surrounded by redness and hemosiderin staining.  Patient is currently taking Bactrim by mouth.  Scheduled with the patient to see vascular today.  Instructed the patient would like to place an Unna boot to left lower extremity will have him see vascular 1st and then have him follow up with us after.  Patient currently has apply for medicated was denied.  He is self pay.  Patient will visit [place] [id number] for assistance with insurance.  Place referral for Orthopedics for right hand middle trigger finger.  Wound care performed today at bedside left lower extremity ulcer cleansed with Vashe, silver polymem to wound bed, gauze, Kerlix, and secure with Tubigrip size F.  Instructed wound care to perform every-other-day.  Instructed to elevate lower extremity while out of bed for long periods of time.  Place lidocaine 5% topical gel to area today due to pain.  Instructed the patient will have to take over-the-counter  Tylenol or ibuprofen alternating as to pain.  Verbalized understanding of all instructions.    History includes:      History reviewed. No pertinent past medical history.   Past Surgical History:   Procedure Laterality Date    ABDOMINAL HERNIA REPAIR Left     x2    ANKLE SURGERY Left     FINGER SURGERY Left     middle    FINGER SURGERY Right     thumb    WRIST SURGERY Left       Social History     Socioeconomic History    Marital status:    Tobacco Use    Smoking status: Former     Types: Cigarettes    Smokeless tobacco: Never   Substance and Sexual Activity    Alcohol use: Yes     Alcohol/week: 6.0 standard drinks of alcohol     Types: 6 Cans of beer per week     Comment: 6 pk per week    Drug use: Yes     Frequency: 7.0 times per week     Types: Marijuana     Comment: gummies daily   .      Current Outpatient Medications   Medication Sig Dispense Refill    baclofen (LIORESAL) 10 MG tablet Take 1 tablet (10 mg total) by mouth 3 (three) times daily as needed (muscle spasms). 21 tablet 0    gentamicin (GARAMYCIN) 0.1 % ointment Apply topically once daily. After cleaning and drying wound, apply thin layer. (Patient not taking: Reported on 12/5/2023) 15 g 0     No current facility-administered medications for this encounter.       Review of Systems   Constitutional:  Positive for appetite change.   Skin:  Positive for wound.   All other systems reviewed and are negative.         Labs Reviewed:   Chemistry:  Lab Results   Component Value Date    BUN 11.4 11/29/2023    CREATININE 0.79 11/29/2023    EGFRNORACEVR >60 11/29/2023    GLUCOSE 102 (H) 11/29/2023    AST 16 11/29/2023    ALT 10 11/29/2023        Hematology:  Lab Results   Component Value Date    WBC 5.82 11/29/2023    HGB 11.9 (L) 11/29/2023    HCT 36.1 (L) 11/29/2023     11/29/2023       Inflammatory Markers:  Lab Results   Component Value Date    SEDRATE 24 (H) 11/29/2023        Objective:        Physical Exam  Vitals reviewed.   Cardiovascular:       Pulses:           Dorsalis pedis pulses are 2+ on the right side and 2+ on the left side.        Posterior tibial pulses are 2+ on the right side and detected w/ Doppler on the left side.   Feet:      Right foot:      Toenail Condition: Right toenails are normal.      Left foot:      Toenail Condition: Left toenails are normal.   Skin:     General: Skin is warm and dry.      Capillary Refill: Capillary refill takes less than 2 seconds.      Findings: Wound present.             Comments: Left lower extremity medial ulcer red granulating wound bed with minimal slough moderate serosanguineous drainage.   Neurological:      Mental Status: He is alert.            Incision/Site 12/05/23 0837 Left Leg lower;medial (Active)   12/05/23 0837   Present Prior to Hospital Arrival?: Yes   Side: Left   Location: Leg   Orientation: lower;medial   Incision Type:    Closure Method:    Additional Comments:    Removal Indication and Assessment:    Wound Outcome:    Removal Indications:    Wound Image   12/12/23 1402   Dressing Appearance Moist drainage 12/12/23 1402   Drainage Amount Moderate 12/12/23 1402   Drainage Characteristics/Odor Serosanguineous 12/12/23 1402   Appearance Pink;Slough 12/12/23 1402   Wound Length (cm) 6.8 cm 12/12/23 1402   Wound Width (cm) 3.4 cm 12/12/23 1402   Wound Depth (cm) 0.2 cm 12/12/23 1402   Wound Volume (cm^3) 4.624 cm^3 12/12/23 1402   Wound Surface Area (cm^2) 23.12 cm^2 12/12/23 1402         Assessment:         ICD-10-CM ICD-9-CM   1. 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 Open wound of ankle with complication, unspecified laterality, subsequent encounter  S91.009D V58.89     891.1   3. Peripheral vascular disease  I73.9 443.9   4. Retained orthopedic hardware  Z96.9 V45.89   5. Tobacco abuse  Z72.0 305.1         Plan:   Tissue pathology and/or culture taken:  [] Yes [x] No   Sharp debridement  performed:   [] Yes [x] No   Labs ordered this visit:   [] Yes [x] No   Imaging ordered this visit:   [] Yes [x] No         1. 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    2. Open wound of ankle with complication subsequent    Requiring Unna boot.    3. Peripheral vascular disease     Saw Vascular on 12/6/23 will continue unna boot until vascular studies done.    4. Retained orthopedic hardware     Co-factor in delayed wound healing.    5. Tobacco abuse     Must stop smoking. Explained the negative impact this has on not only the wounds (delayed healing), but overall health as well.       Orders Placed This Encounter   Procedures    Wound Culture          The time spent including preparing to see the patient, obtaining patient history and assessment, evaluation of the plan of care, patient/caregiver counseling and education, orders, documentation, coordination of care, and other professional medical management activities for today's encounter was 15 minute.    Time spent performing procedures during today's encounter was 15 minute.    Follow up in about 3 days (around 12/15/2023) for nurse and with Fouzia 12/19/23. Teaching provided on s/s to call wound clinic for promptly.  ER precautions taught for after hours and weekends.         RAFAEL Bailey

## 2023-12-15 ENCOUNTER — HOSPITAL ENCOUNTER (OUTPATIENT)
Dept: WOUND CARE | Facility: HOSPITAL | Age: 53
Discharge: HOME | End: 2023-12-15
Attending: NURSE PRACTITIONER

## 2023-12-15 VITALS
SYSTOLIC BLOOD PRESSURE: 149 MMHG | RESPIRATION RATE: 18 BRPM | TEMPERATURE: 98 F | DIASTOLIC BLOOD PRESSURE: 87 MMHG | OXYGEN SATURATION: 97 % | HEART RATE: 84 BPM

## 2023-12-15 DIAGNOSIS — L97.825 NON-PRESSURE CHRONIC ULCER OF OTHER PART OF LEFT LOWER LEG WITH MUSCLE INVOLVEMENT WITHOUT EVIDENCE OF NECROSIS: Primary | ICD-10-CM

## 2023-12-15 DIAGNOSIS — S91.001D OPEN WOUND OF RIGHT ANKLE WITH COMPLICATION, SUBSEQUENT ENCOUNTER: ICD-10-CM

## 2023-12-15 PROCEDURE — 27000999 HC MEDICAL RECORD PHOTO DOCUMENTATION

## 2023-12-15 PROCEDURE — 29580 STRAPPING UNNA BOOT: CPT

## 2023-12-15 PROCEDURE — 99211 OFF/OP EST MAY X REQ PHY/QHP: CPT | Mod: 25

## 2023-12-15 RX ORDER — CEFDINIR 300 MG/1
300 CAPSULE ORAL 2 TIMES DAILY
Qty: 20 CAPSULE | Refills: 0 | Status: SHIPPED | OUTPATIENT
Start: 2023-12-15 | End: 2023-12-25

## 2023-12-15 NOTE — PROGRESS NOTES
[facility]                Wound Care Services    Subjective:       Patient ID: Tawanda Johnson is a 53 y.o. male.    Chief Complaint: Wound Consult (Unna boot left leg)    HPI  Review of Systems      Objective:     Vitals:    12/15/23 0919   BP: (!) 149/87   Pulse: 84   Resp: 18   Temp: 97.8 °F (36.6 °C)         Physical Exam       Incision/Site 12/05/23 0837 Left Leg lower;medial (Active)   12/05/23 0837   Present Prior to Hospital Arrival?: Yes   Side: Left   Location: Leg   Orientation: lower;medial   Incision Type:    Closure Method:    Additional Comments:    Removal Indication and Assessment:    Wound Outcome:    Removal Indications:    Wound Image   12/15/23 0920   Dressing Appearance Moist drainage 12/15/23 0920   Drainage Amount Moderate 12/15/23 0920   Drainage Characteristics/Odor Serosanguineous 12/15/23 0920   Appearance Pink;Slough;Yellow 12/15/23 0920   Wound Length (cm) 6.5 cm 12/15/23 0920   Wound Width (cm) 3.5 cm 12/15/23 0920   Wound Depth (cm) 0.2 cm 12/15/23 0920   Wound Volume (cm^3) 4.55 cm^3 12/15/23 0920   Wound Surface Area (cm^2) 22.75 cm^2 12/15/23 0920         Assessment/Plan:         ICD-10-CM ICD-9-CM   1. 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 Open wound of right ankle with complication, subsequent encounter  S91.001D V58.89     891.1           Tissue pathology and/or culture taken:  [] Yes [] No   Sharp debridement performed:   [] Yes [] No   Labs ordered this visit:   [] Yes [] No   Imaging ordered this visit:   [] Yes [] No           Orders Placed This Encounter   Procedures    Change dressing     See last progress note     Standing Status:   Standing     Number of Occurrences:   12     Standing Expiration Date:   3/15/2024    Change dressing     See last progress note     Standing Status:   Standing     Number of Occurrences:   1        No  follow-ups on file.               Removed unna boot. Cleansed leg and wound. Applied therahoney foam to wound followed by drawtex, unna boot, kerlix, coban. Patient tolerated well. RTC Tuesday for visit with Fouzia HALL

## 2023-12-16 LAB
BACTERIA WND CULT: ABNORMAL
BACTERIA WND CULT: ABNORMAL

## 2023-12-18 ENCOUNTER — TELEPHONE (OUTPATIENT)
Dept: WOUND CARE | Facility: HOSPITAL | Age: 53
End: 2023-12-18

## 2023-12-18 RX ORDER — LEVOFLOXACIN 500 MG/1
500 TABLET, FILM COATED ORAL DAILY
Qty: 10 TABLET | Refills: 0 | Status: SHIPPED | OUTPATIENT
Start: 2023-12-18 | End: 2023-12-28

## 2023-12-19 ENCOUNTER — HOSPITAL ENCOUNTER (OUTPATIENT)
Dept: WOUND CARE | Facility: HOSPITAL | Age: 53
Discharge: HOME | End: 2023-12-19
Attending: NURSE PRACTITIONER

## 2023-12-19 VITALS
SYSTOLIC BLOOD PRESSURE: 159 MMHG | TEMPERATURE: 98 F | DIASTOLIC BLOOD PRESSURE: 86 MMHG | RESPIRATION RATE: 18 BRPM | OXYGEN SATURATION: 96 % | HEART RATE: 82 BPM

## 2023-12-19 DIAGNOSIS — Z72.0 TOBACCO ABUSE: ICD-10-CM

## 2023-12-19 DIAGNOSIS — Z96.9 RETAINED ORTHOPEDIC HARDWARE: ICD-10-CM

## 2023-12-19 DIAGNOSIS — S91.009D: ICD-10-CM

## 2023-12-19 DIAGNOSIS — I73.9 PERIPHERAL VASCULAR DISEASE: ICD-10-CM

## 2023-12-19 DIAGNOSIS — S91.001D OPEN WOUND OF RIGHT ANKLE WITH COMPLICATION, SUBSEQUENT ENCOUNTER: ICD-10-CM

## 2023-12-19 DIAGNOSIS — L97.825 NON-PRESSURE CHRONIC ULCER OF OTHER PART OF LEFT LOWER LEG WITH MUSCLE INVOLVEMENT WITHOUT EVIDENCE OF NECROSIS: Primary | ICD-10-CM

## 2023-12-19 PROCEDURE — 29580 STRAPPING UNNA BOOT: CPT

## 2023-12-19 PROCEDURE — 99213 PR OFFICE/OUTPT VISIT, EST, LEVL III, 20-29 MIN: ICD-10-PCS | Mod: ,,, | Performed by: NURSE PRACTITIONER

## 2023-12-19 PROCEDURE — 99213 OFFICE O/P EST LOW 20 MIN: CPT | Mod: ,,, | Performed by: NURSE PRACTITIONER

## 2023-12-19 PROCEDURE — 27000999 HC MEDICAL RECORD PHOTO DOCUMENTATION

## 2023-12-19 PROCEDURE — 99211 OFF/OP EST MAY X REQ PHY/QHP: CPT

## 2023-12-19 NOTE — PROGRESS NOTES
[facility]   Outpatient Wound Care     Subjective:   Patient ID: Taawnda oJhnson is a 53 y.o. male.    Chief Complaint: Wound Consult      History of Present Illness:   53 y.o. White male being seen today for follow up after Unna boot placement to left lower leg ulcer.  Wound has been present for approximately 1 year.   Current wound care: Unna boot placement. Followed by No, Primary Doctor for PCP referral placed for PCP.  Works as a .      Today's visit 12/19/23:  Reviewed previous progress notes.  Unna boots removed per nursing staff at bedside.  Both of lower extremity cleanse with Hibiclens soap and water per nursing staff.  Left lower leg medial ulcer bed red granulating with minimal slough, and moderate serosanguineous drainage.  Informed the patient of wound culture results will change cefdinir to Levaquin.  Discussion with the patient today will continue Unna boot.  Left lower extremity ulcer cleanse with Vashe, apply Medihoney foam to wound bed, cover with Drawtex, unna boot, Kerlix, and Coban.  Tolerated well.  Instructed to call the office with any questions, concerns, or any reasons needing to remove Unna boot. Vashe, apply Medihoney foam to wound bed, cover with Drawtex, unna boot, Kerlix, and Coban.    12/12/23:  Reviewed previous progress notes. Unna boot removed per nursing staff at bedside. Left lower leg medial wound with slough and red granulating tissue with moderate serosanguinous drainage. Would culture obtained. Discussion today regarding changing wound care to ulcer, and will continue unna boot. Wound care performed per nursing staff at bedside. Cleanse wound with Vashe, apply Medihoney foam to wound bed, cover with Drawtex, unna boot, Kerlix, and Coban. Pt complaining of itching at times. Instructed may take Benadryl for  itching. Will have him return on Friday for nurse visit for unna boot change. Instructed to call office with any questions, concerns, or any reason he has to take off unna boot. Instructed to elevate lower extremity while out of bed for long periods of time.  Verbalized understanding of all instructions.    12/8/23:  Reviewed previous progress notes.  Unna boot removed per nursing staff at bedside.  Large amount of moderate serosanguineous drainage with dressing removal.  Left lower extremity ulcer red with slough noted to wound bed significant decrease in size since Unna boot placement.  Periwound maceration to edges.  Discussion today will apply Unna boot.  Wound care to lower extremity wound bed cleansed with half strength Dakin's, applied silver polymem to wound bed, Triad to wound edges,Drawtex, Unna boot, and Coban.  Tolerated well.  Wound care performed per nursing staff at bedside.  Unna boot precautions given.  Will have him return to the clinic on Tuesday of next week.  Instructed to elevate lower extremity while out of bed for long periods of time.  Verbalized understanding of all instructions.    12/05/2023:  Presents to clinic alone. Left lower extremity medial surface dressing removed per nursing staff at bedside.  Patient has a large chronic ulcer to left lower medial extremity that has been there proximally 1 year.  Denies any vascular or arterial insufficiency informed the patient due to hemosiderin staining, and wound appearance has some sort of insufficiency to area.  Patient voiced has had a history of fracture in left lower extremity hardware place of the left lower extremity.  Left lower extremity chronic ulcer red granulating with yellow slough to wound bed.  Unable to debride at bedside today due to increase in pain.  Elizabeth wound surrounded by redness and hemosiderin staining.  Patient is currently taking Bactrim by mouth.  Scheduled with the patient to see vascular today.  Instructed the  patient would like to place an Unna boot to left lower extremity will have him see vascular 1st and then have him follow up with us after.  Patient currently has apply for medicated was denied.  He is self pay.  Patient will visit [place] [phone] for assistance with insurance.  Place referral for Orthopedics for right hand middle trigger finger.  Wound care performed today at bedside left lower extremity ulcer cleansed with Vashe, silver polymem to wound bed, gauze, Kerlix, and secure with Tubigrip size F.  Instructed wound care to perform every-other-day.  Instructed to elevate lower extremity while out of bed for long periods of time.  Place lidocaine 5% topical gel to area today due to pain.  Instructed the patient will have to take over-the-counter Tylenol or ibuprofen alternating as to pain.  Verbalized understanding of all instructions.    History includes:      History reviewed. No pertinent past medical history.   Past Surgical History:   Procedure Laterality Date    ABDOMINAL HERNIA REPAIR Left     x2    ANKLE SURGERY Left     FINGER SURGERY Left     middle    FINGER SURGERY Right     thumb    WRIST SURGERY Left       Social History     Socioeconomic History    Marital status:    Tobacco Use    Smoking status: Former     Types: Cigarettes    Smokeless tobacco: Never   Substance and Sexual Activity    Alcohol use: Yes     Alcohol/week: 6.0 standard drinks of alcohol     Types: 6 Cans of beer per week     Comment: 6 pk per week    Drug use: Yes     Frequency: 7.0 times per week     Types: Marijuana     Comment: gummies daily   .      Current Outpatient Medications   Medication Sig Dispense Refill    baclofen (LIORESAL) 10 MG tablet Take 1 tablet (10 mg total) by mouth 3 (three) times daily as needed (muscle spasms). 21 tablet 0    cefdinir (OMNICEF) 300 MG capsule Take 1 capsule (300 mg total) by mouth 2 (two) times daily. for 10 days 20 capsule 0    gentamicin (GARAMYCIN) 0.1 % ointment Apply topically  once daily. After cleaning and drying wound, apply thin layer. (Patient not taking: Reported on 12/5/2023) 15 g 0    levoFLOXacin (LEVAQUIN) 500 MG tablet Take 1 tablet (500 mg total) by mouth once daily. for 10 days (Patient not taking: Reported on 12/19/2023) 10 tablet 0     No current facility-administered medications for this encounter.       Review of Systems   Skin:  Positive for wound.   All other systems reviewed and are negative.         Labs Reviewed:   Chemistry:  Lab Results   Component Value Date    BUN 11.4 11/29/2023    CREATININE 0.79 11/29/2023    EGFRNORACEVR >60 11/29/2023    GLUCOSE 102 (H) 11/29/2023    AST 16 11/29/2023    ALT 10 11/29/2023        Hematology:  Lab Results   Component Value Date    WBC 5.82 11/29/2023    HGB 11.9 (L) 11/29/2023    HCT 36.1 (L) 11/29/2023     11/29/2023       Inflammatory Markers:  Lab Results   Component Value Date    SEDRATE 24 (H) 11/29/2023        Objective:        Physical Exam  Vitals reviewed.   Cardiovascular:      Pulses:           Dorsalis pedis pulses are 2+ on the right side and 2+ on the left side.        Posterior tibial pulses are 2+ on the right side and detected w/ Doppler on the left side.   Feet:      Right foot:      Toenail Condition: Right toenails are normal.      Left foot:      Toenail Condition: Left toenails are normal.   Skin:     General: Skin is warm and dry.      Capillary Refill: Capillary refill takes less than 2 seconds.      Findings: Wound present.             Comments: Left lower extremity medial ulcer red granulating wound bed with minimal slough moderate serosanguineous drainage.   Neurological:      Mental Status: He is alert.            Incision/Site 12/05/23 0837 Left Leg lower;medial (Active)   12/05/23 0837   Present Prior to Hospital Arrival?: Yes   Side: Left   Location: Leg   Orientation: lower;medial   Incision Type:    Closure Method:    Additional Comments:    Removal Indication and Assessment:    Wound  Outcome:    Removal Indications:    Wound Image   12/19/23 0921   Dressing Appearance Moist drainage 12/19/23 0921   Drainage Amount Moderate 12/19/23 0921   Drainage Characteristics/Odor Serosanguineous 12/19/23 0921   Appearance Pink;Yellow;Slough 12/19/23 0921   Wound Length (cm) 7 cm 12/19/23 0921   Wound Width (cm) 3.2 cm 12/19/23 0921   Wound Depth (cm) 0.3 cm 12/19/23 0921   Wound Volume (cm^3) 6.72 cm^3 12/19/23 0921   Wound Surface Area (cm^2) 22.4 cm^2 12/19/23 0921         Assessment:         ICD-10-CM ICD-9-CM   1. 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 Open wound of right ankle with complication, subsequent encounter  S91.001D V58.89     891.1   3. Open wound of ankle with complication, unspecified laterality, subsequent encounter  S91.009D V58.89     891.1   4. Peripheral vascular disease  I73.9 443.9   5. Retained orthopedic hardware  Z96.9 V45.89   6. Tobacco abuse  Z72.0 305.1         Plan:   Tissue pathology and/or culture taken:  [] Yes [x] No   Sharp debridement performed:   [] Yes [x] No   Labs ordered this visit:   [] Yes [x] No   Imaging ordered this visit:   [] Yes [x] No         1. Non-pressure chronic ulcer of other part of left lower leg with muscle involvement without evidence of necrosis     Cleanse with Vashe, apply Medihoney foam to wound bed, cover with Drawtex, unna boot, Kerlix, and Coban. Will return to wound clinic twice weekly.    2. Open wound of ankle with complication subsequent    Requiring Unna boot.    3. Peripheral vascular disease     Saw Vascular on 12/6/23 will continue unna boot until vascular studies done.    4. Retained orthopedic hardware     Co-factor in delayed wound healing.    5. Tobacco abuse     Must stop smoking. Explained the negative impact this has on not only the wounds (delayed healing), but overall health as well.       The time spent including preparing to see  the patient, obtaining patient history and assessment, evaluation of the plan of care, patient/caregiver counseling and education, orders, documentation, coordination of care, and other professional medical management activities for today's encounter was 15 minute.    Time spent performing procedures during today's encounter was 15 minute.    Follow up in about 3 days (around 12/22/2023) for 12/26, 12/29 all nurses with Fouzia 1/2/24. Teaching provided on s/s to call wound clinic for promptly.  ER precautions taught for after hours and weekends.         RAFAEL Bailey

## 2023-12-22 ENCOUNTER — HOSPITAL ENCOUNTER (OUTPATIENT)
Dept: WOUND CARE | Facility: HOSPITAL | Age: 53
Discharge: HOME | End: 2023-12-22
Attending: NURSE PRACTITIONER

## 2023-12-22 VITALS
SYSTOLIC BLOOD PRESSURE: 111 MMHG | TEMPERATURE: 98 F | HEART RATE: 96 BPM | DIASTOLIC BLOOD PRESSURE: 71 MMHG | OXYGEN SATURATION: 99 % | RESPIRATION RATE: 18 BRPM

## 2023-12-22 DIAGNOSIS — Z96.9 RETAINED ORTHOPEDIC HARDWARE: ICD-10-CM

## 2023-12-22 DIAGNOSIS — S91.009D: ICD-10-CM

## 2023-12-22 DIAGNOSIS — L97.825 NON-PRESSURE CHRONIC ULCER OF OTHER PART OF LEFT LOWER LEG WITH MUSCLE INVOLVEMENT WITHOUT EVIDENCE OF NECROSIS: Primary | ICD-10-CM

## 2023-12-22 DIAGNOSIS — I73.9 PERIPHERAL VASCULAR DISEASE: ICD-10-CM

## 2023-12-22 DIAGNOSIS — Z72.0 TOBACCO ABUSE: ICD-10-CM

## 2023-12-22 DIAGNOSIS — S91.001D OPEN WOUND OF RIGHT ANKLE WITH COMPLICATION, SUBSEQUENT ENCOUNTER: ICD-10-CM

## 2023-12-22 PROCEDURE — 27000999 HC MEDICAL RECORD PHOTO DOCUMENTATION

## 2023-12-22 PROCEDURE — 99213 OFFICE O/P EST LOW 20 MIN: CPT | Mod: ,,, | Performed by: NURSE PRACTITIONER

## 2023-12-22 PROCEDURE — 99213 PR OFFICE/OUTPT VISIT, EST, LEVL III, 20-29 MIN: ICD-10-PCS | Mod: ,,, | Performed by: NURSE PRACTITIONER

## 2023-12-22 PROCEDURE — 99211 OFF/OP EST MAY X REQ PHY/QHP: CPT

## 2023-12-22 NOTE — PROGRESS NOTES
[facility]   Outpatient Wound Care     Subjective:   Patient ID: Tawanda Johnson is a 53 y.o. male.    Chief Complaint: Wound Consult      History of Present Illness:   53 y.o. White male being seen today for follow up unna boot dressing change to left lower leg ulcer.  Wound has been present for approximately 1 year.   Current wound care: Unna boot placement. Followed by No, Primary Doctor for PCP referral placed for PCP.  Works as a .      Today's visit 12/22/23:  Reviewed previous progress notes.  Patient presents to clinic today for a nurse visit, made into a regular visit due to increased pain to right lower extremity. Left lower extremity Unna boot removed per nursing staff at bedside. Left  lower extremity cleansed with Hibiclens soap and water.  Discussion with the patient today patient is requesting to hold Unna boot at this time due to increased pain.  Instructed the patient the importance of performing wound care every other day and being compliance of elevating lower extremity while out of bed for long periods of time.  Left lower extremity ulcer red granulating wound bed with macerated edges. Will stop Medihoney.  Instructed on new wound care  cleansed left lower extremity with Vashe, apply Triad to ami wound, silver polymem to wound bed, cover with convamax, wrap with kerlix and tape. Secure with Tubigrip size F.  Instructed perform every-other-day.   Patient voices eating qualify for Medicaid pulled up Medicaid requirements gave the patient number for Kristen in case management to assist with Medicaid application.   Supplies given. Verbalized understanding of all instructions.    12/19/23:  Reviewed previous progress notes.  Unna boots removed per nursing staff at bedside.  Both of lower extremity cleanse with Hibiclens soap and water  per nursing staff.  Left lower leg medial ulcer bed red granulating with minimal slough, and moderate serosanguineous drainage.  Informed the patient of wound culture results will change cefdinir to Levaquin.  Discussion with the patient today will continue Unna boot.  Left lower extremity ulcer cleanse with Vashe, apply Medihoney foam to wound bed, cover with Drawtex, unna boot, Kerlix, and Coban.  Tolerated well.  Instructed to call the office with any questions, concerns, or any reasons needing to remove Unna boot. Vashe, apply Medihoney foam to wound bed, cover with Drawtex, unna boot, Kerlix, and Coban.    12/12/23:  Reviewed previous progress notes. Unna boot removed per nursing staff at bedside. Left lower leg medial wound with slough and red granulating tissue with moderate serosanguinous drainage. Would culture obtained. Discussion today regarding changing wound care to ulcer, and will continue unna boot. Wound care performed per nursing staff at bedside. Cleanse wound with Vashe, apply Medihoney foam to wound bed, cover with Drawtex, unna boot, Kerlix, and Coban. Pt complaining of itching at times. Instructed may take Benadryl for itching. Will have him return on Friday for nurse visit for unna boot change. Instructed to call office with any questions, concerns, or any reason he has to take off unna boot. Instructed to elevate lower extremity while out of bed for long periods of time.  Verbalized understanding of all instructions.    12/8/23:  Reviewed previous progress notes.  Unna boot removed per nursing staff at bedside.  Large amount of moderate serosanguineous drainage with dressing removal.  Left lower extremity ulcer red with slough noted to wound bed significant decrease in size since Unna boot placement.  Periwound maceration to edges.  Discussion today will apply Unna boot.  Wound care to lower extremity wound bed cleansed with half strength Dakin's, applied silver polymem to wound bed, Triad to  wound edges,Drawtex, Unna boot, and Coban.  Tolerated well.  Wound care performed per nursing staff at bedside.  Unna boot precautions given.  Will have him return to the clinic on Tuesday of next week.  Instructed to elevate lower extremity while out of bed for long periods of time.  Verbalized understanding of all instructions.    12/05/2023:  Presents to clinic alone. Left lower extremity medial surface dressing removed per nursing staff at bedside.  Patient has a large chronic ulcer to left lower medial extremity that has been there proximally 1 year.  Denies any vascular or arterial insufficiency informed the patient due to hemosiderin staining, and wound appearance has some sort of insufficiency to area.  Patient voiced has had a history of fracture in left lower extremity hardware place of the left lower extremity.  Left lower extremity chronic ulcer red granulating with yellow slough to wound bed.  Unable to debride at bedside today due to increase in pain.  Elizabeth wound surrounded by redness and hemosiderin staining.  Patient is currently taking Bactrim by mouth.  Scheduled with the patient to see vascular today.  Instructed the patient would like to place an Unna boot to left lower extremity will have him see vascular 1st and then have him follow up with us after.  Patient currently has apply for medicated was denied.  He is self pay.  Patient will visit [place] [phone] for assistance with insurance.  Place referral for Orthopedics for right hand middle trigger finger.  Wound care performed today at bedside left lower extremity ulcer cleansed with Vashe, silver polymem to wound bed, gauze, Kerlix, and secure with Tubigrip size F.  Instructed wound care to perform every-other-day.  Instructed to elevate lower extremity while out of bed for long periods of time.  Place lidocaine 5% topical gel to area today due to pain.  Instructed the patient will have to take over-the-counter Tylenol or ibuprofen alternating as  to pain.  Verbalized understanding of all instructions.    History includes:      History reviewed. No pertinent past medical history.   Past Surgical History:   Procedure Laterality Date    ABDOMINAL HERNIA REPAIR Left     x2    ANKLE SURGERY Left     FINGER SURGERY Left     middle    FINGER SURGERY Right     thumb    WRIST SURGERY Left       Social History     Socioeconomic History    Marital status:    Tobacco Use    Smoking status: Former     Types: Cigarettes    Smokeless tobacco: Never   Substance and Sexual Activity    Alcohol use: Yes     Alcohol/week: 6.0 standard drinks of alcohol     Types: 6 Cans of beer per week     Comment: 6 pk per week    Drug use: Yes     Frequency: 7.0 times per week     Types: Marijuana     Comment: gummies daily   .      Current Outpatient Medications   Medication Sig Dispense Refill    baclofen (LIORESAL) 10 MG tablet Take 1 tablet (10 mg total) by mouth 3 (three) times daily as needed (muscle spasms). 21 tablet 0    cefdinir (OMNICEF) 300 MG capsule Take 1 capsule (300 mg total) by mouth 2 (two) times daily. for 10 days 20 capsule 0    gentamicin (GARAMYCIN) 0.1 % ointment Apply topically once daily. After cleaning and drying wound, apply thin layer. (Patient not taking: Reported on 12/5/2023) 15 g 0    levoFLOXacin (LEVAQUIN) 500 MG tablet Take 1 tablet (500 mg total) by mouth once daily. for 10 days (Patient not taking: Reported on 12/19/2023) 10 tablet 0     No current facility-administered medications for this encounter.       Review of Systems   Skin:  Positive for wound.   All other systems reviewed and are negative.         Labs Reviewed:   Chemistry:  Lab Results   Component Value Date    BUN 11.4 11/29/2023    CREATININE 0.79 11/29/2023    EGFRNORACEVR >60 11/29/2023    GLUCOSE 102 (H) 11/29/2023    AST 16 11/29/2023    ALT 10 11/29/2023        Hematology:  Lab Results   Component Value Date    WBC 5.82 11/29/2023    HGB 11.9 (L) 11/29/2023    HCT 36.1 (L)  11/29/2023     11/29/2023       Inflammatory Markers:  Lab Results   Component Value Date    SEDRATE 24 (H) 11/29/2023        Objective:        Physical Exam  Vitals reviewed.   Cardiovascular:      Pulses:           Dorsalis pedis pulses are 2+ on the right side and 2+ on the left side.        Posterior tibial pulses are 2+ on the right side and detected w/ Doppler on the left side.   Feet:      Right foot:      Toenail Condition: Right toenails are normal.      Left foot:      Toenail Condition: Left toenails are normal.   Skin:     General: Skin is warm and dry.      Capillary Refill: Capillary refill takes less than 2 seconds.      Findings: Wound present.             Comments: Left lower extremity medial ulcer red granulating wound bed with minimal slough moderate serosanguineous drainage with maceration to ami wound.   Neurological:      Mental Status: He is alert.   Psychiatric:         Behavior: Behavior is cooperative.            Incision/Site 12/05/23 0837 Left Leg lower;medial (Active)   12/05/23 0837   Present Prior to Hospital Arrival?: Yes   Side: Left   Location: Leg   Orientation: lower;medial   Incision Type:    Closure Method:    Additional Comments:    Removal Indication and Assessment:    Wound Outcome:    Removal Indications:    Wound Image   12/22/23 0854   Dressing Appearance Moist drainage 12/22/23 0854   Drainage Amount Moderate 12/22/23 0854   Drainage Characteristics/Odor Serosanguineous 12/22/23 0854   Appearance Pink;Yellow;Slough 12/22/23 0854   Wound Length (cm) 6.5 cm 12/22/23 0854   Wound Width (cm) 3 cm 12/22/23 0854   Wound Depth (cm) 0.3 cm 12/22/23 0854   Wound Volume (cm^3) 5.85 cm^3 12/22/23 0854   Wound Surface Area (cm^2) 19.5 cm^2 12/22/23 0854         Assessment:         ICD-10-CM ICD-9-CM   1. 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 Open wound of right ankle with  complication, subsequent encounter  S91.001D V58.89     891.1   3. Open wound of ankle with complication, unspecified laterality, subsequent encounter  S91.009D V58.89     891.1   4. Peripheral vascular disease  I73.9 443.9   5. Retained orthopedic hardware  Z96.9 V45.89   6. Tobacco abuse  Z72.0 305.1         Plan:   Tissue pathology and/or culture taken:  [] Yes [x] No   Sharp debridement performed:   [] Yes [x] No   Labs ordered this visit:   [] Yes [x] No   Imaging ordered this visit:   [] Yes [x] No         1. Non-pressure chronic ulcer of other part of left lower leg with muscle involvement without evidence of necrosis     New wound care: Cleanse left lower extremity with Vashe, apply Triad to ami wound, silver polymem to wound bed, cover with convamax, wrap with kerlix and tape.  Perform every other day.    2. Open wound of ankle with complication subsequent    New wound care: Cleanse left lower extremity with Vashe, apply Triad to ami wound, silver polymem to wound bed, cover with convamax, wrap with kerlix and tape. Perform every other day.    3. Peripheral vascular disease     Saw Vascular on 12/6/23, awaiting Vascular studies and Vascular follow up.    4. Retained orthopedic hardware     Co-factor in delayed wound healing.    5. Tobacco abuse     Must stop smoking. Explained the negative impact this has on not only the wounds (delayed healing), but overall health as well.       The time spent including preparing to see the patient, obtaining patient history and assessment, evaluation of the plan of care, patient/caregiver counseling and education, orders, documentation, coordination of care, and other professional medical management activities for today's encounter was 15 minute.    Time spent performing procedures during today's encounter was 15 minute.    Follow up in about 11 days (around 1/2/2024). Teaching provided on s/s to call wound clinic for promptly.  ER precautions taught for after hours and  weekends.         YOLIS BaileyP

## 2024-01-03 ENCOUNTER — HOSPITAL ENCOUNTER (OUTPATIENT)
Dept: WOUND CARE | Facility: HOSPITAL | Age: 54
Discharge: HOME | End: 2024-01-03
Attending: NURSE PRACTITIONER
Payer: COMMERCIAL

## 2024-01-03 ENCOUNTER — OFFICE VISIT (OUTPATIENT)
Dept: ORTHOPEDICS | Facility: CLINIC | Age: 54
End: 2024-01-03
Payer: COMMERCIAL

## 2024-01-03 VITALS
SYSTOLIC BLOOD PRESSURE: 165 MMHG | DIASTOLIC BLOOD PRESSURE: 90 MMHG | OXYGEN SATURATION: 100 % | HEART RATE: 70 BPM | TEMPERATURE: 98 F | RESPIRATION RATE: 18 BRPM

## 2024-01-03 VITALS — HEART RATE: 64 BPM | DIASTOLIC BLOOD PRESSURE: 92 MMHG | SYSTOLIC BLOOD PRESSURE: 161 MMHG | TEMPERATURE: 98 F

## 2024-01-03 DIAGNOSIS — I73.9 PERIPHERAL VASCULAR DISEASE: ICD-10-CM

## 2024-01-03 DIAGNOSIS — Z72.0 TOBACCO ABUSE: ICD-10-CM

## 2024-01-03 DIAGNOSIS — I10 HYPERTENSION, UNSPECIFIED TYPE: ICD-10-CM

## 2024-01-03 DIAGNOSIS — M24.541 CONTRACTURE OF JOINT OF FINGER, RIGHT: ICD-10-CM

## 2024-01-03 DIAGNOSIS — R22.31 MASS OF RIGHT WRIST: Primary | ICD-10-CM

## 2024-01-03 DIAGNOSIS — S91.001D OPEN WOUND OF RIGHT ANKLE WITH COMPLICATION, SUBSEQUENT ENCOUNTER: ICD-10-CM

## 2024-01-03 DIAGNOSIS — Z96.9 RETAINED ORTHOPEDIC HARDWARE: ICD-10-CM

## 2024-01-03 DIAGNOSIS — R22.31 LOCALIZED SWELLING, MASS, OR LUMP OF RIGHT UPPER EXTREMITY: ICD-10-CM

## 2024-01-03 DIAGNOSIS — R03.0 ELEVATED BP WITHOUT DIAGNOSIS OF HYPERTENSION: ICD-10-CM

## 2024-01-03 DIAGNOSIS — L97.825 NON-PRESSURE CHRONIC ULCER OF OTHER PART OF LEFT LOWER LEG WITH MUSCLE INVOLVEMENT WITHOUT EVIDENCE OF NECROSIS: ICD-10-CM

## 2024-01-03 PROCEDURE — 3077F SYST BP >= 140 MM HG: CPT | Mod: CPTII,,, | Performed by: NURSE PRACTITIONER

## 2024-01-03 PROCEDURE — 1159F MED LIST DOCD IN RCRD: CPT | Mod: CPTII,,, | Performed by: NURSE PRACTITIONER

## 2024-01-03 PROCEDURE — 27000999 HC MEDICAL RECORD PHOTO DOCUMENTATION

## 2024-01-03 PROCEDURE — 3080F DIAST BP >= 90 MM HG: CPT | Mod: CPTII,,, | Performed by: NURSE PRACTITIONER

## 2024-01-03 PROCEDURE — 1160F RVW MEDS BY RX/DR IN RCRD: CPT | Mod: CPTII,,, | Performed by: NURSE PRACTITIONER

## 2024-01-03 PROCEDURE — 29580 STRAPPING UNNA BOOT: CPT | Mod: RT

## 2024-01-03 PROCEDURE — 99213 OFFICE O/P EST LOW 20 MIN: CPT | Mod: ,,, | Performed by: NURSE PRACTITIONER

## 2024-01-03 PROCEDURE — 99211 OFF/OP EST MAY X REQ PHY/QHP: CPT

## 2024-01-03 PROCEDURE — 99214 OFFICE O/P EST MOD 30 MIN: CPT | Mod: PBBFAC,27 | Performed by: NURSE PRACTITIONER

## 2024-01-03 PROCEDURE — 99215 OFFICE O/P EST HI 40 MIN: CPT | Mod: S$PBB,,, | Performed by: NURSE PRACTITIONER

## 2024-01-03 NOTE — PROGRESS NOTES
Subjective:   PATIENT ID: Tawanda Johnson is a 53 y.o. male. Non-smoker. Employment HX: , currently employed.    Seen [facility] ortho for same DX since n/a.   CHIEF COMPLAINT: Hand Pain of the Right Hand (Referred for Rt Middle Trigger Finger. Pain Level 4 today. Pt. Has been having this for a year now. Pt. Is a )    HPI:    Right sharp, aching  middle finger near palm.  endorses radiating into palm and/or distal finger. 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(?)  Onset: several years ago worsening over time  Modifying Factors: exacerbated by overuse, improvement with gentle massage of palm and digit, warm compress, ice pack  Associated Symptoms: decreased  strength w/ dropping items , middle digit in fixed flexion     Activity: work related activity with noted repetitive motions and/ or prolonged gripping; mechanical locking and pain affecting ADLS including: dressing, grooming  Previous Treatments: OTC Medications: Tylenol, ibuprofen  without symptom relief  PMH: + gout, currently under care of wound care clinic r/t left LE chronic ulcer w/ retained orthopedic hardware s/t ORIF and HX PVD  Family History: + OA    NOTE: New patient referred for right middle finger in fixed flexion x1 year  with limited conservative treatments.  Symptoms affecting ADLs and ability to work. .  Denies severe diffuse swelling, fever, severe pain in palm, redness/ warm or recent hand injury.  Patient requesting referral to establish PCP care, noted elevated BP with vital signs.     Current Outpatient Medications:     baclofen (LIORESAL) 10 MG tablet, Take 1 tablet (10 mg total) by mouth 3 (three) times daily as needed (muscle spasms)., Disp: 21 tablet, Rfl: 0    gentamicin (GARAMYCIN) 0.1 % ointment, Apply topically once daily. After cleaning and drying wound, apply thin layer. (Patient not taking: Reported on 12/5/2023), Disp:  "15 g, Rfl: 0  Review of patient's allergies indicates:  No Known Allergies  No results found for: "HGBA1C"   There is no height or weight on file to calculate BMI.   Vitals:    01/03/24 0859 01/03/24 0901 01/03/24 0903   BP: (!) 164/96  (!) 161/92   Pulse: 64     Temp: 97.6 °F (36.4 °C)     TempSrc: Oral     PainSc:    4       REVIEW OF SYSTEMS:  A ten-point review of systems was performed and is negative, except as mentioned above  Objective:   MSK Hand/ Wrist Exam  General:  no apparent distress, no pain indicators,  obesity  Inspection:  LEFT HAND RIGHT HAND   no joint swelling, no mass/ cyst noted, no soft tissue swelling, no erythema, no lesions, no contusion, no gross deformities, no nodules, no atrophy of thenar or hypothenar eminence,  no scars, no fixed contracture of digits noted mass noted to ulnar wrist, mild soft tissue swelling to ulnar wrist, no erythema, no lesions, no contusion, gross deformities to thumb, noted atrophy of thenar or hypothenar eminence,  noted healed surgical scars, middle finger in fixed contracture   Palpation:     LEFT HAND RIGHT HAND   no joint tenderness, normal temperature, no palpable palm nodules and/ or thickening of flexor tendon, no finger joint tenderness or crepitus, no active triggering or locking of  finger with no tenderness over volar aspect of MCP/ A1 pulley and digital flexor tendon, no tenderness of thenar and hypothenar eminence no joint tenderness, normal temperature, no palpable palm nodules and/ or thickening of flexor tendon, no finger joint tenderness or crepitus, fixed  locking of middle finger with no tenderness over volar aspect of MCP/ A1 pulley and digital flexor tendon, firm, painless mass noted to ulnar wrist with noted middle finger movement when palpated, mild, no tenderness of thenar and hypothenar eminence     ROM LEFT HAND RIGHT HAND   Wrist Flexion / Extension   80 / 75 80 / 75   Wrist Radial / Ulnar Deviation   15 / 30 15 / 30   Thumb CMC/ " MCP/ IP WNL w/o pain WNL w/o pain   Finger MCP/PIP/DIP WNL w/o pain middle finger w/ fixed locking/ w/ MCP, PIP flexion, w/ pain      Strength LEFT HAND RIGHT HAND    5 / 5 w/o pain 4 / 5 w/o pain       Special Testing          Carpal Tunnel Syndrome L+ L-- R+ R-- Not Tested     Phalen [] [x] [] [] [x]     Pancho Carpal Compression [] [x] [x] [] []     Cubital Tunnel Syndrome          Tinel's Test @ elbow [] [x] [] [x] []     De Quervain's Tenosynovitis          Finkelstein Test [] [x] [] [x] []     Ligament Injury          Scaphoid Shift Test [] [] [] [] [x]     Lunotriquetral Shuck Test [] [] [] [] [x]     UCL Ligament Thumb Test [] [] [] [] [x]     Ulnar Fovea Sign [] [] [] [] [x]     Nerve Injury  Radial Nerve  IP joint extension against resistance intact   Median Nerve Palmar abduction of thumb intact   Anterior Interosseous Branch OK sign intact   Ulnar Nerve   Abduction of fingers against resistance intact, Froment's sign negative   Neuro/ Vascular: Spurling's Test negative, Intact to light touch, capillary refill 2 seconds,  radial pulse equal 2+, 2 point discrimination intact, Moshe's test intact  Psych: Awake, alert, oriented, normal mood and affect  Lymphatic: No LAD  Skin/ Soft Tissue: no rash, skin intact  Cardio: no edema, vascular integrity noted  Resp: no increased respiratory effort noted   Assessment:   IMAGING: X-ray 3 views of right hand dated 11/29/23 reviewed and independently interpreted by me.  Discussed with patient. Noted no acute, DJD noted.    XR HAND COMPLETE 3 VIEW RIGHT  11/29/23  CLINICAL HISTORY  trigger finger;  TECHNIQUE  A total of 4 images submitted of the right hand.  COMPARISON  None available at the time of initial interpretation.  FINDINGS  There is persistent flexion of the 3rd digit PIP joint.  No evidence of acute cortical displacement or dislocation is identified.  There is no periosteal reaction or destructive skeletal lesion.  There is chronic cortical irregularity  at the base of the 1st digit proximal phalanx, may be related to remote trauma but otherwise indeterminate etiology.  The included soft tissues are without acute abnormality.  IMPRESSION  1. Persistent flexion of the 3rd PIP joint.  2. No convincing acute osseous abnormality.  3. Chronic appearing changes of the 1st digit proximal phalanx base.    EMR REVIEW: completed with noted Referral documentation reviewed    EMG Study:     DIAGNOSIS:  1. Mass of right wrist    2. Contracture of joint of finger, right    3. BMI 29.0-29.9,adult    4. Hypertension, unspecified type       Plan:     Orders Placed This Encounter    MRI Wrist Joint W WO Contrast Right    Ambulatory referral/consult to Internal Medicine     Ongoing education about DX and treatment recommendations including conservative treatments of daily HEP and OTC NSAID as needed according to label instructions if able to tolerate.   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 MRI required for definitive DX and to evaluate need for surgical treatments.    Procedure: n/a   RX Medications: continue medications as RX per PCP .  RTC:  patient scheduled w/ Dr. Geiger hand specialist.  Will obtain MRI and determine if appropriate or referral to tumor specialist based on results     Elevated BP: Internal referral to I[facility] to establish PCP care per patient request.        Leah Menard-Neumann FNP [facility]  Procedure Note:   None  Time Based Billing   Total Time Spent with Patient: 40 minutes .  Visit Start Time: 1000  10 minutes spent prior to visit reviewing EMR, prior labs and x-rays.  20 minutes spent in visit with patient face-to-face time completing exam, obtaining history, educating on DX and treatment plan.  10 minutes spent after visit completing EMR  documentation.   Visit End Time: 1040     Please be aware that this note has been generated with the assistance of MModal voice-to-text.  Please excuse any spelling or grammatical errors.

## 2024-01-04 PROBLEM — R03.0 ELEVATED BP WITHOUT DIAGNOSIS OF HYPERTENSION: Status: ACTIVE | Noted: 2024-01-04

## 2024-01-04 NOTE — PROGRESS NOTES
[facility]   Outpatient Wound Care     Subjective:   Patient ID: Tawanda Johnson is a 53 y.o. male.    Chief Complaint: Wound Consult      History of Present Illness:   53 y.o. White male being seen today for follow up after unna boot removal and new wound care orders to left lower leg ulcer.  Wound has been present for approximately 1 year.   Current wound care: Silver polymen to wound bed, convamax and wrap with kerlix every other day. Followed by No, Primary Doctor for PCP referral placed for PCP.  Works as a .      Today's visit 1/3/24:  Reviewed previous progress notes.  Left lower extremity dressing removed per nursing staff at bedside.  Since last visit had stopped Unna boot per patient request.  Left lower extremity ulcer red granulating wound bed with minimal slough, slight macerated edges, and moderate serosanguineous drainage.  Instructed wound will stop silver polymem and start regular polymem.  Wound care performed per nursing staff at bedside cleansed left lower extremity with Vashe, apply Triad to ami wound, polymem to wound bed,  and wrap with kerlix and tape. Instructed on apply Convamax with increase drainage.  Secure with Tubigrip size F.  Instructed perform every-other-day.  Will have him return to the clinic in 2 weeks.  Referral sent again for PCP due to patient has increase in blood pressure with the family history of hypertension.  Instructed to call the office with any questions, concerns, or new skin issues.    12/22/23:  Reviewed previous progress notes.  Patient presents to clinic today for a nurse visit, made into a regular visit due to increased pain to right lower extremity. Left lower extremity Unna boot removed per nursing staff at bedside. Left  lower extremity cleansed with Hibiclens soap and water.  Discussion with  the patient today patient is requesting to hold Unna boot at this time due to increased pain.  Instructed the patient the importance of performing wound care every other day and being compliance of elevating lower extremity while out of bed for long periods of time.  Left lower extremity ulcer red granulating wound bed with macerated edges. Will stop Medihoney.  Instructed on new wound care  cleansed left lower extremity with Vashe, apply Triad to ami wound, silver polymem to wound bed, cover with convamax, wrap with kerlix and tape. Secure with Tubigrip size F.  Instructed perform every-other-day.   Patient voices eating qualify for Medicaid pulled up Medicaid requirements gave the patient number for Kristen in case management to assist with Medicaid application.   Supplies given. Verbalized understanding of all instructions.    12/19/23:  Reviewed previous progress notes.  Unna boots removed per nursing staff at bedside.  Both of lower extremity cleanse with Hibiclens soap and water per nursing staff.  Left lower leg medial ulcer bed red granulating with minimal slough, and moderate serosanguineous drainage.  Informed the patient of wound culture results will change cefdinir to Levaquin.  Discussion with the patient today will continue Unna boot.  Left lower extremity ulcer cleanse with Vashe, apply Medihoney foam to wound bed, cover with Drawtex, unna boot, Kerlix, and Coban.  Tolerated well.  Instructed to call the office with any questions, concerns, or any reasons needing to remove Unna boot. Vashe, apply Medihoney foam to wound bed, cover with Drawtex, unna boot, Kerlix, and Coban.    12/12/23:  Reviewed previous progress notes. Unna boot removed per nursing staff at bedside. Left lower leg medial wound with slough and red granulating tissue with moderate serosanguinous drainage. Would culture obtained. Discussion today regarding changing wound care to ulcer, and will continue unna boot. Wound care performed  per nursing staff at bedside. Cleanse wound with Vashe, apply Medihoney foam to wound bed, cover with Drawtex, unna boot, Kerlix, and Coban. Pt complaining of itching at times. Instructed may take Benadryl for itching. Will have him return on Friday for nurse visit for unna boot change. Instructed to call office with any questions, concerns, or any reason he has to take off unna boot. Instructed to elevate lower extremity while out of bed for long periods of time.  Verbalized understanding of all instructions.    12/8/23:  Reviewed previous progress notes.  Unna boot removed per nursing staff at bedside.  Large amount of moderate serosanguineous drainage with dressing removal.  Left lower extremity ulcer red with slough noted to wound bed significant decrease in size since Unna boot placement.  Periwound maceration to edges.  Discussion today will apply Unna boot.  Wound care to lower extremity wound bed cleansed with half strength Dakin's, applied silver polymem to wound bed, Triad to wound edges,Drawtex, Unna boot, and Coban.  Tolerated well.  Wound care performed per nursing staff at bedside.  Unna boot precautions given.  Will have him return to the clinic on Tuesday of next week.  Instructed to elevate lower extremity while out of bed for long periods of time.  Verbalized understanding of all instructions.    12/05/2023:  Presents to clinic alone. Left lower extremity medial surface dressing removed per nursing staff at bedside.  Patient has a large chronic ulcer to left lower medial extremity that has been there proximally 1 year.  Denies any vascular or arterial insufficiency informed the patient due to hemosiderin staining, and wound appearance has some sort of insufficiency to area.  Patient voiced has had a history of fracture in left lower extremity hardware place of the left lower extremity.  Left lower extremity chronic ulcer red granulating with yellow slough to wound bed.  Unable to debride at bedside  today due to increase in pain.  Elizabeth wound surrounded by redness and hemosiderin staining.  Patient is currently taking Bactrim by mouth.  Scheduled with the patient to see vascular today.  Instructed the patient would like to place an Unna boot to left lower extremity will have him see vascular 1st and then have him follow up with us after.  Patient currently has apply for medicated was denied.  He is self pay.  Patient will visit [place] [phone] for assistance with insurance.  Place referral for Orthopedics for right hand middle trigger finger.  Wound care performed today at bedside left lower extremity ulcer cleansed with Vashe, silver polymem to wound bed, gauze, Kerlix, and secure with Tubigrip size F.  Instructed wound care to perform every-other-day.  Instructed to elevate lower extremity while out of bed for long periods of time.  Place lidocaine 5% topical gel to area today due to pain.  Instructed the patient will have to take over-the-counter Tylenol or ibuprofen alternating as to pain.  Verbalized understanding of all instructions.    History includes:      History reviewed. No pertinent past medical history.   Past Surgical History:   Procedure Laterality Date    ABDOMINAL HERNIA REPAIR Left     x2    ANKLE SURGERY Left     FINGER SURGERY Left     middle    FINGER SURGERY Right     thumb    WRIST SURGERY Left       Social History     Socioeconomic History    Marital status:    Tobacco Use    Smoking status: Former     Types: Cigarettes    Smokeless tobacco: Never   Substance and Sexual Activity    Alcohol use: Yes     Alcohol/week: 6.0 standard drinks of alcohol     Types: 6 Cans of beer per week     Comment: 6 pk per week    Drug use: Yes     Frequency: 7.0 times per week     Types: Marijuana     Comment: gummies daily   .      Current Outpatient Medications   Medication Sig Dispense Refill    baclofen (LIORESAL) 10 MG tablet Take 1 tablet (10 mg total) by mouth 3 (three) times daily as needed  (muscle spasms). 21 tablet 0    gentamicin (GARAMYCIN) 0.1 % ointment Apply topically once daily. After cleaning and drying wound, apply thin layer. (Patient not taking: Reported on 12/5/2023) 15 g 0     No current facility-administered medications for this encounter.       Review of Systems   Skin:  Positive for wound.   All other systems reviewed and are negative.         Labs Reviewed:   Chemistry:  Lab Results   Component Value Date    BUN 11.4 11/29/2023    CREATININE 0.79 11/29/2023    EGFRNORACEVR >60 11/29/2023    GLUCOSE 102 (H) 11/29/2023    AST 16 11/29/2023    ALT 10 11/29/2023        Hematology:  Lab Results   Component Value Date    WBC 5.82 11/29/2023    HGB 11.9 (L) 11/29/2023    HCT 36.1 (L) 11/29/2023     11/29/2023       Inflammatory Markers:  Lab Results   Component Value Date    SEDRATE 24 (H) 11/29/2023        Objective:        Physical Exam  Vitals reviewed.   Cardiovascular:      Pulses:           Dorsalis pedis pulses are 2+ on the right side and 2+ on the left side.        Posterior tibial pulses are 2+ on the right side and detected w/ Doppler on the left side.   Feet:      Right foot:      Toenail Condition: Right toenails are normal.      Left foot:      Toenail Condition: Left toenails are normal.   Skin:     General: Skin is warm and dry.      Capillary Refill: Capillary refill takes less than 2 seconds.      Findings: Wound present.             Comments: Left lower extremity medial ulcer red granulating wound bed with minimal slough moderate serosanguineous drainage with maceration to ami wound.   Neurological:      Mental Status: He is alert.   Psychiatric:         Behavior: Behavior is cooperative.            Incision/Site 12/05/23 0837 Left Leg lower;medial (Active)   12/05/23 0837   Present Prior to Hospital Arrival?: Yes   Side: Left   Location: Leg   Orientation: lower;medial   Incision Type:    Closure Method:    Additional Comments:    Removal Indication and  Assessment:    Wound Outcome:    Removal Indications:    Wound Image   01/03/24 1055   Dressing Appearance Moist drainage 01/03/24 1055   Drainage Amount Moderate 01/03/24 1055   Drainage Characteristics/Odor Serosanguineous 01/03/24 1055   Appearance Red 01/03/24 1055   Wound Length (cm) 6.5 cm 01/03/24 1055   Wound Width (cm) 3.5 cm 01/03/24 1055   Wound Depth (cm) 0.3 cm 01/03/24 1055   Wound Volume (cm^3) 6.825 cm^3 01/03/24 1055   Wound Surface Area (cm^2) 22.75 cm^2 01/03/24 1055         Assessment:         ICD-10-CM ICD-9-CM   1. 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 Open wound of right ankle with complication, subsequent encounter  S91.001D V58.89     891.1   3. Elevated BP without diagnosis of hypertension  R03.0 796.2   4. Peripheral vascular disease  I73.9 443.9   5. Retained orthopedic hardware  Z96.9 V45.89   6. Tobacco abuse  Z72.0 305.1         Plan:   Tissue pathology and/or culture taken:  [] Yes [x] No   Sharp debridement performed:   [] Yes [x] No   Labs ordered this visit:   [] Yes [x] No   Imaging ordered this visit:   [] Yes [x] No        1. Non-pressure chronic ulcer of other part of left lower leg with muscle involvement without evidence of necrosis     Cleanse left lower extremity with Vashe, apply Triad to ami wound, polymem to wound bed, cover with convamax, wrap with kerlix and tape.  Perform every other day.    2. Open wound of right ankle with complication, subsequent encounter     Cleanse left lower extremity with Vashe, apply Triad to ami wound, polymem to wound bed, cover with convamax, wrap with kerlix and tape. Perform every other day.    3. Elevated BP without diagnosis of hypertension     Instructed on danger of hypertension and refer referral placed again for PCP   4. Peripheral vascular disease     Under the care of vascular.   5. Retained orthopedic hardware     Co-factor in  delayed wound healing.    6. Tobacco abuse     Must stop smoking. Explained the negative impact this has on not only the wounds (delayed healing), but overall health as well.        The time spent including preparing to see the patient, obtaining patient history and assessment, evaluation of the plan of care, patient/caregiver counseling and education, orders, documentation, coordination of care, and other professional medical management activities for today's encounter was 15 minute.    Time spent performing procedures during today's encounter was 15 minute.    Follow up in about 2 weeks (around 1/17/2024). Teaching provided on s/s to call wound clinic for promptly.  ER precautions taught for after hours and weekends.         RAFAEL Bailey

## 2024-01-17 ENCOUNTER — HOSPITAL ENCOUNTER (OUTPATIENT)
Dept: WOUND CARE | Facility: HOSPITAL | Age: 54
Discharge: HOME | End: 2024-01-17
Attending: NURSE PRACTITIONER
Payer: COMMERCIAL

## 2024-01-17 VITALS
HEART RATE: 77 BPM | RESPIRATION RATE: 18 BRPM | DIASTOLIC BLOOD PRESSURE: 81 MMHG | SYSTOLIC BLOOD PRESSURE: 136 MMHG | OXYGEN SATURATION: 98 %

## 2024-01-17 DIAGNOSIS — Z96.9 RETAINED ORTHOPEDIC HARDWARE: ICD-10-CM

## 2024-01-17 DIAGNOSIS — Z72.0 TOBACCO ABUSE: ICD-10-CM

## 2024-01-17 DIAGNOSIS — S91.001D OPEN WOUND OF RIGHT ANKLE WITH COMPLICATION, SUBSEQUENT ENCOUNTER: ICD-10-CM

## 2024-01-17 DIAGNOSIS — R03.0 ELEVATED BP WITHOUT DIAGNOSIS OF HYPERTENSION: ICD-10-CM

## 2024-01-17 DIAGNOSIS — L97.825 NON-PRESSURE CHRONIC ULCER OF OTHER PART OF LEFT LOWER LEG WITH MUSCLE INVOLVEMENT WITHOUT EVIDENCE OF NECROSIS: Primary | ICD-10-CM

## 2024-01-17 DIAGNOSIS — I73.9 PERIPHERAL VASCULAR DISEASE: ICD-10-CM

## 2024-01-17 PROCEDURE — 27000999 HC MEDICAL RECORD PHOTO DOCUMENTATION

## 2024-01-17 PROCEDURE — 99211 OFF/OP EST MAY X REQ PHY/QHP: CPT | Mod: 25

## 2024-01-17 PROCEDURE — 29580 STRAPPING UNNA BOOT: CPT

## 2024-01-17 PROCEDURE — 99213 OFFICE O/P EST LOW 20 MIN: CPT | Mod: ,,, | Performed by: NURSE PRACTITIONER

## 2024-01-17 NOTE — PROGRESS NOTES
[facility]   Outpatient Wound Care     Subjective:   Patient ID: Tawanda Johnson is a 53 y.o. male.    Chief Complaint: Wound Consult      History of Present Illness:   53 y.o. White male being seen today for follow up after unna boot removal and new wound care orders to left lower leg ulcer.  Wound has been present for approximately 1 year.   Current wound care:  Unna boot.  Followed by No, Primary Doctor for PCP referral placed for PCP.  Will see Dr. Taylor tomorrow. Works as a .      Today's visit 1/17/24:  Reviewed previous progress notes.  Left lower extremity dressing removed per nursing staff at bedside.  Discussion with the patient today in the last 2 weeks no significant change in wound status or size.  Instructed the patient will need to re-apply Unna boot to assist with increased granulation and healing wound.  Patient agreed.  All wound care performed at bedside per nursing staff.  Left lower extremity ankle ulcer red granulating wound bed minimal slough, with moderate serosanguineous drainage.  Left lower ankle ulcer cleansed with Vashe, applied Triad wound edge, Medihoney foam, Drawtex, unna boot, Kerlix, and Coban.  Tolerated well.  Will come return to clinic on Friday for a nurse visit for Unna boot change.  Instructed to call office with any questions, concerns, or any reason to remove Unna boots. Increased in B/P rechecked 136/81. Will see Dr. Taylor tomorrow for PCP.  Verbalized understanding of all instructions.    1/3/24:  Reviewed previous progress notes.  Left lower extremity dressing removed per nursing staff at bedside.  Since last visit had stopped Unna boot per patient request.  Left lower extremity ulcer red granulating wound bed with minimal slough, slight macerated edges, and moderate serosanguineous drainage.   Instructed wound will stop silver polymem and start regular polymem.  Wound care performed per nursing staff at bedside cleansed left lower extremity with Vashe, apply Triad to ami wound, polymem to wound bed,  and wrap with kerlix and tape. Instructed on apply Convamax with increase drainage.  Secure with Tubigrip size F.  Instructed perform every-other-day.  Will have him return to the clinic in 2 weeks.  Referral sent again for PCP due to patient has increase in blood pressure with the family history of hypertension.  Instructed to call the office with any questions, concerns, or new skin issues.    12/22/23:  Reviewed previous progress notes.  Patient presents to clinic today for a nurse visit, made into a regular visit due to increased pain to right lower extremity. Left lower extremity Unna boot removed per nursing staff at bedside. Left  lower extremity cleansed with Hibiclens soap and water.  Discussion with the patient today patient is requesting to hold Unna boot at this time due to increased pain.  Instructed the patient the importance of performing wound care every other day and being compliance of elevating lower extremity while out of bed for long periods of time.  Left lower extremity ulcer red granulating wound bed with macerated edges. Will stop Medihoney.  Instructed on new wound care  cleansed left lower extremity with Vashe, apply Triad to ami wound, silver polymem to wound bed, cover with convamax, wrap with kerlix and tape. Secure with Tubigrip size F.  Instructed perform every-other-day.   Patient voices eating qualify for Medicaid pulled up Medicaid requirements gave the patient number for Kristen in case management to assist with Medicaid application.   Supplies given. Verbalized understanding of all instructions.    12/19/23:  Reviewed previous progress notes.  Unna boots removed per nursing staff at bedside.  Both of lower extremity cleanse with Hibiclens soap and water per nursing staff.   Left lower leg medial ulcer bed red granulating with minimal slough, and moderate serosanguineous drainage.  Informed the patient of wound culture results will change cefdinir to Levaquin.  Discussion with the patient today will continue Unna boot.  Left lower extremity ulcer cleanse with Vashe, apply Medihoney foam to wound bed, cover with Drawtex, unna boot, Kerlix, and Coban.  Tolerated well.  Instructed to call the office with any questions, concerns, or any reasons needing to remove Unna boot. Vashe, apply Medihoney foam to wound bed, cover with Drawtex, unna boot, Kerlix, and Coban.    12/12/23:  Reviewed previous progress notes. Unna boot removed per nursing staff at bedside. Left lower leg medial wound with slough and red granulating tissue with moderate serosanguinous drainage. Would culture obtained. Discussion today regarding changing wound care to ulcer, and will continue unna boot. Wound care performed per nursing staff at bedside. Cleanse wound with Vashe, apply Medihoney foam to wound bed, cover with Drawtex, unna boot, Kerlix, and Coban. Pt complaining of itching at times. Instructed may take Benadryl for itching. Will have him return on Friday for nurse visit for unna boot change. Instructed to call office with any questions, concerns, or any reason he has to take off unna boot. Instructed to elevate lower extremity while out of bed for long periods of time.  Verbalized understanding of all instructions.    12/8/23:  Reviewed previous progress notes.  Unna boot removed per nursing staff at bedside.  Large amount of moderate serosanguineous drainage with dressing removal.  Left lower extremity ulcer red with slough noted to wound bed significant decrease in size since Unna boot placement.  Periwound maceration to edges.  Discussion today will apply Unna boot.  Wound care to lower extremity wound bed cleansed with half strength Dakin's, applied silver polymem to wound bed, Triad to wound  edges,Drawtex, Unna boot, and Coban.  Tolerated well.  Wound care performed per nursing staff at bedside.  Unna boot precautions given.  Will have him return to the clinic on Tuesday of next week.  Instructed to elevate lower extremity while out of bed for long periods of time.  Verbalized understanding of all instructions.    12/05/2023:  Presents to clinic alone. Left lower extremity medial surface dressing removed per nursing staff at bedside.  Patient has a large chronic ulcer to left lower medial extremity that has been there proximally 1 year.  Denies any vascular or arterial insufficiency informed the patient due to hemosiderin staining, and wound appearance has some sort of insufficiency to area.  Patient voiced has had a history of fracture in left lower extremity hardware place of the left lower extremity.  Left lower extremity chronic ulcer red granulating with yellow slough to wound bed.  Unable to debride at bedside today due to increase in pain.  Elizabeth wound surrounded by redness and hemosiderin staining.  Patient is currently taking Bactrim by mouth.  Scheduled with the patient to see vascular today.  Instructed the patient would like to place an Unna boot to left lower extremity will have him see vascular 1st and then have him follow up with us after.  Patient currently has apply for medicated was denied.  He is self pay.  Patient will visit [place] [phone] for assistance with insurance.  Place referral for Orthopedics for right hand middle trigger finger.  Wound care performed today at bedside left lower extremity ulcer cleansed with Vashe, silver polymem to wound bed, gauze, Kerlix, and secure with Tubigrip size F.  Instructed wound care to perform every-other-day.  Instructed to elevate lower extremity while out of bed for long periods of time.  Place lidocaine 5% topical gel to area today due to pain.  Instructed the patient will have to take over-the-counter Tylenol or ibuprofen alternating as to  pain.  Verbalized understanding of all instructions.    History includes:      History reviewed. No pertinent past medical history.   Past Surgical History:   Procedure Laterality Date    ABDOMINAL HERNIA REPAIR Left     x2    ANKLE SURGERY Left     FINGER SURGERY Left     middle    FINGER SURGERY Right     thumb    WRIST SURGERY Left       Social History     Socioeconomic History    Marital status:    Tobacco Use    Smoking status: Former     Types: Cigarettes    Smokeless tobacco: Never   Substance and Sexual Activity    Alcohol use: Yes     Alcohol/week: 6.0 standard drinks of alcohol     Types: 6 Cans of beer per week     Comment: 6 pk per week    Drug use: Yes     Frequency: 7.0 times per week     Types: Marijuana     Comment: gummies daily   .      Current Outpatient Medications   Medication Sig Dispense Refill    baclofen (LIORESAL) 10 MG tablet Take 1 tablet (10 mg total) by mouth 3 (three) times daily as needed (muscle spasms). 21 tablet 0    gentamicin (GARAMYCIN) 0.1 % ointment Apply topically once daily. After cleaning and drying wound, apply thin layer. (Patient not taking: Reported on 12/5/2023) 15 g 0     No current facility-administered medications for this encounter.       Review of Systems   Skin:  Positive for wound.   All other systems reviewed and are negative.         Labs Reviewed:   Chemistry:  Lab Results   Component Value Date    BUN 11.4 11/29/2023    CREATININE 0.79 11/29/2023    EGFRNORACEVR >60 11/29/2023    GLUCOSE 102 (H) 11/29/2023    AST 16 11/29/2023    ALT 10 11/29/2023        Hematology:  Lab Results   Component Value Date    WBC 5.82 11/29/2023    HGB 11.9 (L) 11/29/2023    HCT 36.1 (L) 11/29/2023     11/29/2023       Inflammatory Markers:  Lab Results   Component Value Date    SEDRATE 24 (H) 11/29/2023        Objective:        Physical Exam  Vitals reviewed.   Cardiovascular:      Pulses:           Dorsalis pedis pulses are 2+ on the right side and 2+ on the left  side.        Posterior tibial pulses are 2+ on the right side and detected w/ Doppler on the left side.   Feet:      Right foot:      Toenail Condition: Right toenails are normal.      Left foot:      Toenail Condition: Left toenails are normal.   Skin:     General: Skin is warm and dry.      Capillary Refill: Capillary refill takes less than 2 seconds.      Findings: Wound present.             Comments: Left lower extremity medial ulcer red granulating wound bed with minimal slough moderate serosanguineous drainage with maceration to ami wound.   Neurological:      Mental Status: He is alert.   Psychiatric:         Behavior: Behavior is cooperative.            Incision/Site 12/05/23 0837 Left Leg lower;medial (Active)   12/05/23 0837   Present Prior to Hospital Arrival?: Yes   Side: Left   Location: Leg   Orientation: lower;medial   Incision Type:    Closure Method:    Additional Comments:    Removal Indication and Assessment:    Wound Outcome:    Removal Indications:    Wound Image   01/17/24 1036   Dressing Appearance Moist drainage 01/17/24 1036   Drainage Amount Moderate 01/17/24 1036   Drainage Characteristics/Odor Serosanguineous 01/17/24 1036   Appearance Red 01/17/24 1036   Wound Length (cm) 6.4 cm 01/17/24 1036   Wound Width (cm) 3.5 cm 01/17/24 1036   Wound Depth (cm) 0.3 cm 01/17/24 1036   Wound Volume (cm^3) 6.72 cm^3 01/17/24 1036   Wound Surface Area (cm^2) 22.4 cm^2 01/17/24 1036         Assessment:         ICD-10-CM ICD-9-CM   1. 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 Open wound of right ankle with complication, subsequent encounter  S91.001D V58.89     891.1   3. Elevated BP without diagnosis of hypertension  R03.0 796.2   4. Peripheral vascular disease  I73.9 443.9   5. Retained orthopedic hardware  Z96.9 V45.89   6. Tobacco abuse  Z72.0 305.1         Plan:   Tissue pathology and/or culture taken:  []  Yes [x] No   Sharp debridement performed:   [] Yes [x] No   Labs ordered this visit:   [] Yes [x] No   Imaging ordered this visit:   [] Yes [x] No        1. Non-pressure chronic ulcer of other part of left lower leg with muscle involvement without evidence of necrosis     Cleanse left lower extremity with Vashe, apply Triad to ami wound, Medihoney foam, Drawtex, unna boot, Kerlix, and coban. Will return to clinic on Friday for unna boot change.    2. Open wound of right ankle with complication, subsequent encounter     Cleanse left lower extremity with Vashe, apply Triad to ami wound, Medihoney foam, Drawtex, unna boot, Kerlix, and coban. Will return to clinic on Friday for unna boot change.    3. Elevated BP without diagnosis of hypertension     Instructed on danger of hypertension and refer referral placed again for PCP   4. Peripheral vascular disease     Under the care of vascular.   5. Retained orthopedic hardware     Co-factor in delayed wound healing.    6. Tobacco abuse     Must stop smoking. Explained the negative impact this has on not only the wounds (delayed healing), but overall health as well.        The time spent including preparing to see the patient, obtaining patient history and assessment, evaluation of the plan of care, patient/caregiver counseling and education, orders, documentation, coordination of care, and other professional medical management activities for today's encounter was 15 minute.    Time spent performing procedures during today's encounter was 15 minute.    Follow up in about 2 days (around 1/19/2024) for Nurse visit for Friday next Tues in am. Teaching provided on s/s to call wound clinic for promptly.  ER precautions taught for after hours and weekends.         RAFAEL Bailey

## 2024-01-23 ENCOUNTER — HOSPITAL ENCOUNTER (OUTPATIENT)
Dept: WOUND CARE | Facility: HOSPITAL | Age: 54
Discharge: HOME | End: 2024-01-23
Attending: NURSE PRACTITIONER
Payer: COMMERCIAL

## 2024-01-23 VITALS
OXYGEN SATURATION: 98 % | RESPIRATION RATE: 18 BRPM | HEART RATE: 73 BPM | SYSTOLIC BLOOD PRESSURE: 151 MMHG | DIASTOLIC BLOOD PRESSURE: 78 MMHG

## 2024-01-23 DIAGNOSIS — S91.001D OPEN WOUND OF RIGHT ANKLE WITH COMPLICATION, SUBSEQUENT ENCOUNTER: ICD-10-CM

## 2024-01-23 DIAGNOSIS — Z72.0 TOBACCO ABUSE: ICD-10-CM

## 2024-01-23 DIAGNOSIS — Z96.9 RETAINED ORTHOPEDIC HARDWARE: ICD-10-CM

## 2024-01-23 DIAGNOSIS — L97.825 NON-PRESSURE CHRONIC ULCER OF OTHER PART OF LEFT LOWER LEG WITH MUSCLE INVOLVEMENT WITHOUT EVIDENCE OF NECROSIS: Primary | ICD-10-CM

## 2024-01-23 DIAGNOSIS — R03.0 ELEVATED BP WITHOUT DIAGNOSIS OF HYPERTENSION: ICD-10-CM

## 2024-01-23 DIAGNOSIS — I73.9 PERIPHERAL VASCULAR DISEASE: ICD-10-CM

## 2024-01-23 PROCEDURE — 27000999 HC MEDICAL RECORD PHOTO DOCUMENTATION

## 2024-01-23 PROCEDURE — 99211 OFF/OP EST MAY X REQ PHY/QHP: CPT | Mod: 25

## 2024-01-23 PROCEDURE — 29580 STRAPPING UNNA BOOT: CPT

## 2024-01-23 PROCEDURE — 99213 OFFICE O/P EST LOW 20 MIN: CPT | Mod: ,,, | Performed by: NURSE PRACTITIONER

## 2024-01-23 NOTE — PROGRESS NOTES
[facility]   Outpatient Wound Care     Subjective:   Patient ID: Tawanda Johnson is a 53 y.o. male.    Chief Complaint: Wound Consult ( )      History of Present Illness:   53 y.o. White male being seen today for follow up after unna boot removal and new wound care orders to left lower leg ulcer.  Wound has been present for approximately 1 year.   Current wound care:  Unna boot.  Followed by No, Primary Doctor for PCP referral placed for PCP.  Will see Dr. Talyor tomorrow. Works as a .      Today's visit 1/23/24:  Reviewed previous progress notes.  Patient reports to clinic to make visit Friday for Unna change due to he had to work.  Patient was instructed to remove Unna boot last Friday patient voices that he removed on Sunday.  Discussion today regarding Unna boot for 1 week.  Patient said she will be able to tolerate.  All wound care performed per nursing staff at bedside.  Left lower extremity ankle wound red granulating moderate serosanguineous drainage. Left lower ankle ulcer cleansed with Vashe, applied Triad wound edge, Medihoney foam, Drawtex wrap, unna boot, Kerlix, and Coban.  Tolerated well.  We will have him come to the clinic in 1 week.  Instructed on unna boot precautions.  Instructed to call the office if he has to remove the Unna boot for any reasons prior to next visit. Elevation in blood pressure today patient missed his follow up with a PCP.  Instructed on dangers of hypertension, and compliance of MD visits.  Verbalized understanding of all instructions.     1/17/24:  Reviewed previous progress notes.  Left lower extremity dressing removed per nursing staff at bedside.  Discussion with the patient today in the last 2 weeks no significant change in wound status or size.  Instructed the patient will need to re-apply Unna boot to  assist with increased granulation and healing wound.  Patient agreed.  All wound care performed at bedside per nursing staff.  Left lower extremity ankle ulcer red granulating wound bed minimal slough, with moderate serosanguineous drainage.  Left lower ankle ulcer cleansed with Vashe, applied Triad wound edge, Medihoney foam, Drawtex, unna boot, Kerlix, and Coban.  Tolerated well.  Will come return to clinic on Friday for a nurse visit for Unna boot change.  Instructed to call office with any questions, concerns, or any reason to remove Unna boots. Increased in B/P rechecked 136/81. Will see Dr. Taylor tomorrow for PCP.  Verbalized understanding of all instructions.    1/3/24:  Reviewed previous progress notes.  Left lower extremity dressing removed per nursing staff at bedside.  Since last visit had stopped Unna boot per patient request.  Left lower extremity ulcer red granulating wound bed with minimal slough, slight macerated edges, and moderate serosanguineous drainage.  Instructed wound will stop silver polymem and start regular polymem.  Wound care performed per nursing staff at bedside cleansed left lower extremity with Vashe, apply Triad to ami wound, polymem to wound bed,  and wrap with kerlix and tape. Instructed on apply Convamax with increase drainage.  Secure with Tubigrip size F.  Instructed perform every-other-day.  Will have him return to the clinic in 2 weeks.  Referral sent again for PCP due to patient has increase in blood pressure with the family history of hypertension.  Instructed to call the office with any questions, concerns, or new skin issues.    12/22/23:  Reviewed previous progress notes.  Patient presents to clinic today for a nurse visit, made into a regular visit due to increased pain to right lower extremity. Left lower extremity Unna boot removed per nursing staff at bedside. Left  lower extremity cleansed with Hibiclens soap and water.  Discussion with the patient today  patient is requesting to hold Unna boot at this time due to increased pain.  Instructed the patient the importance of performing wound care every other day and being compliance of elevating lower extremity while out of bed for long periods of time.  Left lower extremity ulcer red granulating wound bed with macerated edges. Will stop Medihoney.  Instructed on new wound care  cleansed left lower extremity with Vashe, apply Triad to mai wound, silver polymem to wound bed, cover with convamax, wrap with kerlix and tape. Secure with Tubigrip size F.  Instructed perform every-other-day.   Patient voices eating qualify for Medicaid pulled up Medicaid requirements gave the patient number for Kristen in case management to assist with Medicaid application.   Supplies given. Verbalized understanding of all instructions.    12/19/23:  Reviewed previous progress notes.  Unna boots removed per nursing staff at bedside.  Both of lower extremity cleanse with Hibiclens soap and water per nursing staff.  Left lower leg medial ulcer bed red granulating with minimal slough, and moderate serosanguineous drainage.  Informed the patient of wound culture results will change cefdinir to Levaquin.  Discussion with the patient today will continue Unna boot.  Left lower extremity ulcer cleanse with Vashe, apply Medihoney foam to wound bed, cover with Drawtex, unna boot, Kerlix, and Coban.  Tolerated well.  Instructed to call the office with any questions, concerns, or any reasons needing to remove Unna boot. Vashe, apply Medihoney foam to wound bed, cover with Drawtex, unna boot, Kerlix, and Coban.    12/12/23:  Reviewed previous progress notes. Unna boot removed per nursing staff at bedside. Left lower leg medial wound with slough and red granulating tissue with moderate serosanguinous drainage. Would culture obtained. Discussion today regarding changing wound care to ulcer, and will continue unna boot. Wound care performed per nursing staff  at bedside. Cleanse wound with Vashe, apply Medihoney foam to wound bed, cover with Drawtex, unna boot, Kerlix, and Coban. Pt complaining of itching at times. Instructed may take Benadryl for itching. Will have him return on Friday for nurse visit for unna boot change. Instructed to call office with any questions, concerns, or any reason he has to take off unna boot. Instructed to elevate lower extremity while out of bed for long periods of time.  Verbalized understanding of all instructions.    12/8/23:  Reviewed previous progress notes.  Unna boot removed per nursing staff at bedside.  Large amount of moderate serosanguineous drainage with dressing removal.  Left lower extremity ulcer red with slough noted to wound bed significant decrease in size since Unna boot placement.  Periwound maceration to edges.  Discussion today will apply Unna boot.  Wound care to lower extremity wound bed cleansed with half strength Dakin's, applied silver polymem to wound bed, Triad to wound edges,Drawtex, Unna boot, and Coban.  Tolerated well.  Wound care performed per nursing staff at bedside.  Unna boot precautions given.  Will have him return to the clinic on Tuesday of next week.  Instructed to elevate lower extremity while out of bed for long periods of time.  Verbalized understanding of all instructions.    12/05/2023:  Presents to clinic alone. Left lower extremity medial surface dressing removed per nursing staff at bedside.  Patient has a large chronic ulcer to left lower medial extremity that has been there proximally 1 year.  Denies any vascular or arterial insufficiency informed the patient due to hemosiderin staining, and wound appearance has some sort of insufficiency to area.  Patient voiced has had a history of fracture in left lower extremity hardware place of the left lower extremity.  Left lower extremity chronic ulcer red granulating with yellow slough to wound bed.  Unable to debride at bedside today due to  increase in pain.  Elizabeth wound surrounded by redness and hemosiderin staining.  Patient is currently taking Bactrim by mouth.  Scheduled with the patient to see vascular today.  Instructed the patient would like to place an Unna boot to left lower extremity will have him see vascular 1st and then have him follow up with us after.  Patient currently has apply for medicated was denied.  He is self pay.  Patient will visit [place] [id number] for assistance with insurance.  Place referral for Orthopedics for right hand middle trigger finger.  Wound care performed today at bedside left lower extremity ulcer cleansed with Vashe, silver polymem to wound bed, gauze, Kerlix, and secure with Tubigrip size F.  Instructed wound care to perform every-other-day.  Instructed to elevate lower extremity while out of bed for long periods of time.  Place lidocaine 5% topical gel to area today due to pain.  Instructed the patient will have to take over-the-counter Tylenol or ibuprofen alternating as to pain.  Verbalized understanding of all instructions.    History includes:      History reviewed. No pertinent past medical history.   Past Surgical History:   Procedure Laterality Date    ABDOMINAL HERNIA REPAIR Left     x2    ANKLE SURGERY Left     FINGER SURGERY Left     middle    FINGER SURGERY Right     thumb    WRIST SURGERY Left       Social History     Socioeconomic History    Marital status:    Tobacco Use    Smoking status: Former     Types: Cigarettes    Smokeless tobacco: Never   Substance and Sexual Activity    Alcohol use: Yes     Alcohol/week: 6.0 standard drinks of alcohol     Types: 6 Cans of beer per week     Comment: 6 pk per week    Drug use: Yes     Frequency: 7.0 times per week     Types: Marijuana     Comment: gummies daily   .      Current Outpatient Medications   Medication Sig Dispense Refill    baclofen (LIORESAL) 10 MG tablet Take 1 tablet (10 mg total) by mouth 3 (three) times daily as needed (muscle spasms).  21 tablet 0    gentamicin (GARAMYCIN) 0.1 % ointment Apply topically once daily. After cleaning and drying wound, apply thin layer. (Patient not taking: Reported on 12/5/2023) 15 g 0     No current facility-administered medications for this encounter.       Review of Systems   Skin:  Positive for wound.   All other systems reviewed and are negative.         Labs Reviewed:   Chemistry:  Lab Results   Component Value Date    BUN 11.4 11/29/2023    CREATININE 0.79 11/29/2023    EGFRNORACEVR >60 11/29/2023    GLUCOSE 102 (H) 11/29/2023    AST 16 11/29/2023    ALT 10 11/29/2023        Hematology:  Lab Results   Component Value Date    WBC 5.82 11/29/2023    HGB 11.9 (L) 11/29/2023    HCT 36.1 (L) 11/29/2023     11/29/2023       Inflammatory Markers:  Lab Results   Component Value Date    SEDRATE 24 (H) 11/29/2023        Objective:        Physical Exam  Vitals reviewed.   Cardiovascular:      Pulses:           Dorsalis pedis pulses are 2+ on the right side and 2+ on the left side.        Posterior tibial pulses are 2+ on the right side and detected w/ Doppler on the left side.   Feet:      Right foot:      Toenail Condition: Right toenails are normal.      Left foot:      Toenail Condition: Left toenails are normal.   Skin:     General: Skin is warm and dry.      Capillary Refill: Capillary refill takes less than 2 seconds.      Findings: Wound present.             Comments: Left lower extremity medial ulcer red granulating wound bed with moderate serosanguineous drainage with slight maceration to ami wound.   Neurological:      Mental Status: He is alert.   Psychiatric:         Behavior: Behavior is cooperative.            Incision/Site 12/05/23 0837 Left Leg lower;medial (Active)   12/05/23 0837   Present Prior to Hospital Arrival?: Yes   Side: Left   Location: Leg   Orientation: lower;medial   Incision Type:    Closure Method:    Additional Comments:    Removal Indication and Assessment:    Wound Outcome:     Removal Indications:    Dressing Appearance Moist drainage 01/23/24 1011   Drainage Amount Moderate 01/23/24 1011   Drainage Characteristics/Odor Serosanguineous 01/23/24 1011   Appearance Red 01/23/24 1011   Wound Length (cm) 6.7 cm 01/23/24 1011   Wound Width (cm) 3 cm 01/23/24 1011   Wound Depth (cm) 0.2 cm 01/23/24 1011   Wound Volume (cm^3) 4.02 cm^3 01/23/24 1011   Wound Surface Area (cm^2) 20.1 cm^2 01/23/24 1011         Assessment:         ICD-10-CM ICD-9-CM   1. 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 Open wound of right ankle with complication, subsequent encounter  S91.001D V58.89     891.1   3. Elevated BP without diagnosis of hypertension  R03.0 796.2   4. Peripheral vascular disease  I73.9 443.9   5. Retained orthopedic hardware  Z96.9 V45.89   6. Tobacco abuse  Z72.0 305.1         Plan:   Tissue pathology and/or culture taken:  [] Yes [x] No   Sharp debridement performed:   [] Yes [x] No   Labs ordered this visit:   [] Yes [x] No   Imaging ordered this visit:   [] Yes [x] No        1. Non-pressure chronic ulcer of other part of left lower leg with muscle involvement without evidence of necrosis     Cleanse left lower extremity with Vashe, apply Triad to ami wound, Medihoney foam, Drawtex, unna boot, Kerlix, and coban.    2. Open wound of right ankle with complication, subsequent encounter     Cleanse left lower extremity with Vashe, apply Triad to ami wound, Medihoney foam, Drawtex, unna boot, Kerlix, and coban.    3. Elevated BP without diagnosis of hypertension     Instructed on dangers of hypertension and  PCP appt canceled per pt.   4. Peripheral vascular disease     Under the care of vascular.   5. Retained orthopedic hardware     Co-factor in delayed wound healing.    6. Tobacco abuse     Must stop smoking. Explained the negative impact this has on not only the wounds (delayed healing), but overall  health as well.        The time spent including preparing to see the patient, obtaining patient history and assessment, evaluation of the plan of care, patient/caregiver counseling and education, orders, documentation, coordination of care, and other professional medical management activities for today's encounter was 15 minute.    Time spent performing procedures during today's encounter was 15 minute.    Follow up in about 1 week (around 1/30/2024) for unna boot. Teaching provided on s/s to call wound clinic for promptly.  Unna boot and ER precautions taught for after hours and weekends.         Fouzia Fraire, RAFAEL

## 2024-03-04 DIAGNOSIS — I83.009 VENOUS STASIS ULCER, UNSPECIFIED SITE, UNSPECIFIED ULCER STAGE, UNSPECIFIED WHETHER VARICOSE VEINS PRESENT: Primary | ICD-10-CM

## 2024-03-04 DIAGNOSIS — L97.909 VENOUS STASIS ULCER, UNSPECIFIED SITE, UNSPECIFIED ULCER STAGE, UNSPECIFIED WHETHER VARICOSE VEINS PRESENT: Primary | ICD-10-CM

## 2024-03-11 ENCOUNTER — TELEPHONE (OUTPATIENT)
Dept: VASCULAR SURGERY | Facility: CLINIC | Age: 54
End: 2024-03-11
Payer: COMMERCIAL

## 2024-03-11 NOTE — TELEPHONE ENCOUNTER
----- Message from [place] Cid sent at 3/4/2024  3:26 PM CST -----  Regarding: RE: Cancel appt  Appt canceled. Thanks   ----- Message -----  From: Marcie Burrows LPN  Sent: 3/4/2024   2:00 PM CST  To: [place] Cid  Subject: Cancel appt                                      Patient has [facility] exchange plan.